# Patient Record
Sex: MALE | Race: WHITE | NOT HISPANIC OR LATINO | Employment: UNEMPLOYED | URBAN - METROPOLITAN AREA
[De-identification: names, ages, dates, MRNs, and addresses within clinical notes are randomized per-mention and may not be internally consistent; named-entity substitution may affect disease eponyms.]

---

## 2022-10-28 ENCOUNTER — PATIENT OUTREACH (OUTPATIENT)
Dept: FAMILY MEDICINE CLINIC | Facility: CLINIC | Age: 52
End: 2022-10-28

## 2022-10-28 ENCOUNTER — OFFICE VISIT (OUTPATIENT)
Dept: FAMILY MEDICINE CLINIC | Facility: CLINIC | Age: 52
End: 2022-10-28

## 2022-10-28 VITALS
DIASTOLIC BLOOD PRESSURE: 76 MMHG | RESPIRATION RATE: 18 BRPM | BODY MASS INDEX: 25.91 KG/M2 | HEIGHT: 68 IN | TEMPERATURE: 96.9 F | HEART RATE: 72 BPM | OXYGEN SATURATION: 99 % | WEIGHT: 171 LBS | SYSTOLIC BLOOD PRESSURE: 116 MMHG

## 2022-10-28 DIAGNOSIS — F32.A DEPRESSION, UNSPECIFIED DEPRESSION TYPE: Primary | ICD-10-CM

## 2022-10-28 DIAGNOSIS — Z78.9 NEED FOR FOLLOW-UP BY SOCIAL WORKER: Primary | ICD-10-CM

## 2022-10-28 RX ORDER — FLUOXETINE HYDROCHLORIDE 20 MG/1
20 CAPSULE ORAL DAILY
Qty: 90 CAPSULE | Refills: 3 | Status: SHIPPED | OUTPATIENT
Start: 2022-10-28

## 2022-10-28 NOTE — PROGRESS NOTES
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  He has found it difficult to find other jobs that can allow him to do both  Bianca Clinton identified finances as a major stressor  He pays $1500/month for rent and cannot afford it on his own if his wife leaves  He is working on his [employer] license to become a   HELEN CANTRELL and Bianca Clinton did discuss [employer] as an option  He has been in touch with them and has a meeting on 11/30  Bianca Clinton spoke often about his divorce with his wife  He did also go from topic to topic  HELEN CANTRELL did provide supportive counseling and spoke with him regarding focusing on things he can control as he had kept saying he was having trouble remembering and decreased concentration  HELEN CANTRELL did provide contact information for [employer] for free consultation  He also would go through cycles of blaming himself for the divorce, blaming himself for being too empathetic, and then blamed it on his wife going through menopause  His wife had wanted him to go to therapy but he never did because of the cost  He is not eligible for Medical Assistance at this time  He does have [email or web address]  HELEN CANTRELL and Bianca Clinton discussed connecting with therapist and psychiatrist with funding through the [facility] department   He is agreeable and HELEN CANTRELL facilitated call and was connected with [employer] who completed the Liability Packet with Forest Montalvo via phone call  She advised that [address] will reach out Monday 10/31 to schedule an intake  He will have a $0 copay which will last 1 year unless obtains insurance that covers mental health care  It will not cover medications  Forest Montalvo expressed understanding and will pay for them out of pocket  Forest Montalvo denied suicidal ideation  HELEN CANTRELL did provide [facility] ([facility] AT [facility] and directed him to go the ED in the event of a mental health crisis  He does have support from sister who is also local     HELEN CANTRELL reviewed the form to apply for the sliding fee scale (SFS) through [employer]  HELEN CANTRELL did explain difference between this for care at [place] [address] and that it does not cover care outside of [place] [address]  HELEN CANTRELL did advised there are different discount programs for specialist offices and HELEN CANTRELL and review them with patient as need arises  Forest Montalvo did appear overwhelmed but did express understanding  Per request from [address] wrote down all the information provided one sheet of paper and a brief explanation  HEELN CANRTELL also put each in priority  HELEN CANTRELL did walk Forest Montalvo to where he needs to check out and the financial counselor office  HELEN CANTRELL reminded him to  his medication prescribed at this office visit  HELEN CANTRELL will remain available for psychosocial support as needed

## 2022-10-28 NOTE — ASSESSMENT & PLAN NOTE
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

## 2022-10-28 NOTE — PROGRESS NOTES
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 [place]  Currently has 2 kids in which he is concerned that he will not be seeing lung after the divorce is finalized  Currently not working and not able to seek out treatment for mental health  Plan  Will start patient on Prozac 20 mg daily  Will have social work engage with this patient during this visit  Will follow-up with patient in a month  Emergency resources were provided  Orders:  -     FLUoxetine (PROzac) 20 mg capsule; Take 1 capsule (20 mg total) by mouth daily       Subjective:      Patient ID: Elizabeth Maldonado is a 47 y o  male  No real significant past medical history coming in for establishment of care  Patient reports that he has been feeling severely depressed since his wife has wanted a divorce  This has been a significant source of distress as patient recently moved here with his family from [place] with hopes to build a life with her here in the [place]  Patient was recently seen at [facility] due to significant distress, however at that time he did not meet criteria for inpatient  Patient would like to have assistance with his mental health however, he is unable to afford mental health services as he is not working at this time  Patient denies any suicidal or homicidal ideation at this time  The following portions of the patient's history were reviewed and updated as appropriate: allergies, current medications, past family history, past medical history, past social history, past surgical history, and problem list     Review of Systems   Constitutional: Negative for chills and fever  HENT: Negative for ear pain and sore throat  Eyes: Negative for pain and visual disturbance  Respiratory: Negative for cough and shortness of breath  Cardiovascular: Negative for chest pain and palpitations  Gastrointestinal: Negative for abdominal pain and vomiting  Genitourinary: Negative for dysuria and hematuria  Musculoskeletal: Negative for arthralgias and back pain  Skin: Negative for color change and rash  Neurological: Negative for seizures and syncope  Psychiatric/Behavioral: Positive for decreased concentration  Significantly distress  All other systems reviewed and are negative  Objective:      /76 (BP Location: Left arm, Patient Position: Sitting, Cuff Size: Adult)   Pulse 72   Temp (!) 96 9 °F (36 1 °C) (Temporal)   Resp 18   Ht 5' 7 72" (1 72 m)   Wt 77 6 kg (171 lb)   SpO2 99%   BMI 26 22 kg/m²          Physical Exam  Constitutional:       General: He is not in acute distress  Appearance: Normal appearance  He is not toxic-appearing or diaphoretic  HENT:      Head: Normocephalic  Mouth/Throat:      Mouth: Mucous membranes are moist    Eyes:      Extraocular Movements: Extraocular movements intact  Pupils: Pupils are equal, round, and reactive to light  Cardiovascular:      Rate and Rhythm: Normal rate and regular rhythm  Pulmonary:      Effort: Pulmonary effort is normal  No tachypnea  Breath sounds: Normal breath sounds  No wheezing or rales  Chest:      Chest wall: No edema  There is no dullness to percussion  Abdominal:      General: Abdomen is flat  There is no distension  Palpations: Abdomen is soft  Tenderness: There is no abdominal tenderness  Musculoskeletal:      Right lower leg: No edema  Left lower leg: No edema  Skin:     Capillary Refill: Capillary refill takes less than 2 seconds  Neurological:      General: No focal deficit present  Mental Status: He is alert and oriented to person, place, and time  Psychiatric:         Mood and Affect: Mood is anxious  Affect is tearful  Speech: Speech normal          Behavior: Behavior normal          Thought Content: Thought content does not include homicidal or suicidal ideation             Carie  Medicine PGY-2  10/28/2022

## 2022-10-31 NOTE — PROGRESS NOTES
10/31/22:    HELEN CANTRELL received call from Mayra Bowers with [facility] who advised the paperwork was sent to [facility] and patient should be receiving a call from them today  HELEN CANTRELL did review [employer] and noticed sfs application had been received 10/19  HELEN CANTRELL noted as of 10/29 the patient's balance is $0     HELEN CANTRELL will continue to remain available for psychosocial support as needed

## 2022-11-04 ENCOUNTER — OFFICE VISIT (OUTPATIENT)
Dept: FAMILY MEDICINE CLINIC | Facility: CLINIC | Age: 52
End: 2022-11-04

## 2022-11-04 VITALS
BODY MASS INDEX: 25.16 KG/M2 | DIASTOLIC BLOOD PRESSURE: 80 MMHG | HEART RATE: 80 BPM | TEMPERATURE: 96.9 F | WEIGHT: 166 LBS | OXYGEN SATURATION: 97 % | SYSTOLIC BLOOD PRESSURE: 108 MMHG | RESPIRATION RATE: 18 BRPM | HEIGHT: 68 IN

## 2022-11-04 DIAGNOSIS — F32.A DEPRESSION, UNSPECIFIED DEPRESSION TYPE: Primary | ICD-10-CM

## 2022-11-04 DIAGNOSIS — Z23 ENCOUNTER FOR ADMINISTRATION OF VACCINE: ICD-10-CM

## 2022-11-04 DIAGNOSIS — G47.9 SLEEP DISTURBANCE: ICD-10-CM

## 2022-11-04 DIAGNOSIS — Z00.00 HEALTH CARE MAINTENANCE: ICD-10-CM

## 2022-11-04 RX ORDER — TRAZODONE HYDROCHLORIDE 50 MG/1
50 TABLET ORAL
Qty: 90 TABLET | Refills: 3 | Status: SHIPPED | OUTPATIENT
Start: 2022-11-04

## 2022-11-04 NOTE — PATIENT INSTRUCTIONS
Melatonin with trazodone 50 mg 30 minutes before bedtime     Still having issues sleeping consider taking additional trazodone tablet 50 mg after  1/2 hour of not sleeping

## 2022-11-05 PROBLEM — G47.9 SLEEP DISTURBANCE: Status: ACTIVE | Noted: 2022-11-05

## 2022-11-05 PROBLEM — Z00.00 HEALTH CARE MAINTENANCE: Status: ACTIVE | Noted: 2022-11-05

## 2022-11-05 NOTE — ASSESSMENT & PLAN NOTE
Mood improved from last visit  Patient has started Prozac medication and reports no side effects  Patient reports counseling appointment on Monday  Patient is working with  to address his social determinants of health  Will have patient follow up on 11/27 to continue to assess his mood  Can consider increasing Prozac after 6 weeks

## 2022-11-05 NOTE — ASSESSMENT & PLAN NOTE
Patient up-to-date on all his shot after the influenza vaccine as well as the Tdap vaccine  Patient is currently uninsured  Will address colonoscopy once patient obtains insurance

## 2022-11-05 NOTE — PROGRESS NOTES
Assessment/Plan:    1  Depression, unspecified depression type  Assessment & Plan:  Mood improved from last visit  Patient has started Prozac medication and reports no side effects  Patient reports counseling appointment on Monday  Patient is working with  to address his social determinants of health  Will have patient follow up on 11/27 to continue to assess his mood  Can consider increasing Prozac after 6 weeks  2  Sleep disturbance  Assessment & Plan:  Currently not practicing good sleep hygiene in the setting of acute stressors  S encourage patient to have same sleeping and waking time  In the interim, will prescribe trazodone 50 mg 30 minutes before bed with melatonin 100 mcg  Can consider additional dose of trazodone if patient is having issues falling asleep initially  Orders:  -     traZODone (DESYREL) 50 mg tablet; Take 1 tablet (50 mg total) by mouth daily at bedtime  -     Melatonin 500 MCG TBDP; Take 2 tablets (1,000 mcg total) by mouth daily at bedtime    3  Health care maintenance  Assessment & Plan:  Patient up-to-date on all his shot after the influenza vaccine as well as the Tdap vaccine  Patient is currently uninsured  Will address colonoscopy once patient obtains insurance  Orders:  -     Hepatitis C antibody; Future  -     HIV 1/2 ANTIGEN/ANTIBODY (4TH GENERATION) W REFLEX [facility]; Future    4  Encounter for administration of vaccine  -     influenza vaccine, quadrivalent, recombinant, PF, 0 5 mL, for patients 18 yr+ (FLUBLOK)  -     TDAP VACCINE GREATER THAN OR EQUAL TO 8YO IM       Subjective:      Patient ID: Horacio Russell is a 47 y o  male  Past medical history notable for acute stress disorder secondary to ongoing divorce, was recently started on Prozac and plugged in with therapist to address this acute stress disorder is coming in for follow-up visit  Patient noted that his therapy appointment 1st time is going to be on Monday    Patient reports that he has been compliant with the Prozac medication  Patient reports that currently he has had trouble sleeping  He has issues falling asleep as his mind is always racing with thoughts  Patient is currently  Sleeping on the couch  And has issues falling asleep  Patient denies any homicidal or suicidal ideation  The following portions of the patient's history were reviewed and updated as appropriate: allergies, current medications, past family history, past medical history, past social history, past surgical history, and problem list     Review of Systems   Constitutional: Negative for chills and fever  HENT: Negative for ear pain and sore throat  Eyes: Negative for pain and visual disturbance  Respiratory: Negative for cough and shortness of breath  Cardiovascular: Negative for chest pain and palpitations  Gastrointestinal: Negative for abdominal pain and vomiting  Genitourinary: Negative for dysuria and hematuria  Musculoskeletal: Negative for arthralgias and back pain  Skin: Negative for color change and rash  Neurological: Negative for seizures and syncope  Psychiatric/Behavioral: Positive for decreased concentration  Significantly distress  All other systems reviewed and are negative  Objective:      /80 (BP Location: Left arm, Patient Position: Sitting, Cuff Size: Adult)   Pulse 80   Temp (!) 96 9 °F (36 1 °C) (Temporal)   Resp 18   Ht 5' 7 72" (1 72 m)   Wt 75 3 kg (166 lb)   SpO2 97%   BMI 25 45 kg/m²          Physical Exam  Constitutional:       General: He is not in acute distress  Appearance: Normal appearance  He is not toxic-appearing or diaphoretic  HENT:      Head: Normocephalic  Mouth/Throat:      Mouth: Mucous membranes are moist    Eyes:      Extraocular Movements: Extraocular movements intact  Pupils: Pupils are equal, round, and reactive to light  Cardiovascular:      Rate and Rhythm: Normal rate and regular rhythm  Pulmonary:      Effort: Pulmonary effort is normal  No tachypnea  Breath sounds: Normal breath sounds  No wheezing or rales  Chest:      Chest wall: No edema  There is no dullness to percussion  Abdominal:      General: Abdomen is flat  There is no distension  Palpations: Abdomen is soft  Tenderness: There is no abdominal tenderness  Musculoskeletal:      Right lower leg: No edema  Left lower leg: No edema  Skin:     Capillary Refill: Capillary refill takes less than 2 seconds  Neurological:      General: No focal deficit present  Mental Status: He is alert and oriented to person, place, and time  Psychiatric:         Mood and Affect: Mood is anxious and depressed  Affect is labile and tearful  Speech: Speech normal          Behavior: Behavior normal          Thought Content: Thought content does not include homicidal or suicidal ideation             [place] [address] Medicine PGY-2  11/5/2022

## 2022-11-05 NOTE — ASSESSMENT & PLAN NOTE
Currently not practicing good sleep hygiene in the setting of acute stressors  S encourage patient to have same sleeping and waking time  In the interim, will prescribe trazodone 50 mg 30 minutes before bed with melatonin 100 mcg  Can consider additional dose of trazodone if patient is having issues falling asleep initially

## 2022-11-29 ENCOUNTER — PATIENT OUTREACH (OUTPATIENT)
Dept: FAMILY MEDICINE CLINIC | Facility: CLINIC | Age: 52
End: 2022-11-29

## 2022-11-29 ENCOUNTER — OFFICE VISIT (OUTPATIENT)
Dept: FAMILY MEDICINE CLINIC | Facility: CLINIC | Age: 52
End: 2022-11-29

## 2022-11-29 VITALS
DIASTOLIC BLOOD PRESSURE: 74 MMHG | BODY MASS INDEX: 25.1 KG/M2 | WEIGHT: 165.6 LBS | SYSTOLIC BLOOD PRESSURE: 112 MMHG | HEART RATE: 79 BPM | TEMPERATURE: 97 F | RESPIRATION RATE: 16 BRPM | OXYGEN SATURATION: 97 % | HEIGHT: 68 IN

## 2022-11-29 DIAGNOSIS — G47.9 SLEEP DISTURBANCE: ICD-10-CM

## 2022-11-29 DIAGNOSIS — F32.A DEPRESSION, UNSPECIFIED DEPRESSION TYPE: Primary | ICD-10-CM

## 2022-11-29 DIAGNOSIS — Z00.00 HEALTH CARE MAINTENANCE: ICD-10-CM

## 2022-11-29 RX ORDER — TRAZODONE HYDROCHLORIDE 50 MG/1
50 TABLET ORAL
Qty: 90 TABLET | Refills: 3 | Status: SHIPPED | OUTPATIENT
Start: 2022-11-29

## 2022-11-29 RX ORDER — LORAZEPAM 1 MG/1
1 TABLET ORAL EVERY 6 HOURS PRN
COMMUNITY
Start: 2022-10-17

## 2022-11-29 RX ORDER — FLUOXETINE HYDROCHLORIDE 40 MG/1
40 CAPSULE ORAL DAILY
Qty: 90 CAPSULE | Refills: 3 | Status: SHIPPED | OUTPATIENT
Start: 2022-11-29

## 2022-11-29 NOTE — PROGRESS NOTES
HELEN CANTRELL placed follow up call to the patient, Brenda Smart and discussed progress with mental health care  He stated wants to go to counseling however cannot afford it  HELEN CANTRELL inquired about nodila that was a $0 co-pay through [facility]  Brenda Smart then confirmed that he hasbeen going to [employer]  There have been 3 meetings  Did go yesterday  Brenda Smart needed redirection often to speak to his counselor  Medications prescribed by PCP is helping  HELEN CANTRELL and Brenda Lundis discussed cost of medications  He is only taking one pill and able to afford it  He is stressed and not sleeping well  HELEN CANTRELL encouraged to keep meeting with counselor to address stress  He spent Thanksgiving with his children and sister while spouse is in [employer] handling affairs  Brenda Smart reported wants an   HELEN CANTRELL reminded him that HELEN CANTRELL provided him with contact information for [employer]  HELEN CANTRELL advised they do not take on divorce cases but can consult with them and see if they have low-cost recommendations  Brenda Smart is still fixated on the divorce and that his spouse is not changing her mind  He wishes they would go back to [employer] with her  HELEN CANTRELL provided supportive counseling and encouraged him to focus on things he can control  Brenda Smart is training to be   He has to take a test and then will be hired  He is using savings at this time  HELEN CANTRELL and Brenda Lundis discussed [employer]  He advised that he needs to be available for training and has sciatica that makes most jobs impossible  Brenda Berry indicated that $1600 bill from [facility] - received one month ago - needs to call back  He continued that he spoke with someone from [place] - left message yesterday returning her call yesterday  HELEN CANTRELL will email their financial assistance information  HELEN CANTRELL emailed information regarding [address] and food resources including food glez/pantries, biweekly food distribution through [employer]  com to [email or web address]  com    HELEN CM will close referral as goal of connecting with mental health provider has been met  Patient is aware can contact office or SW CM directly as needs arise  HELEN CM will remain available for future psychosocial support as needed

## 2022-11-29 NOTE — PATIENT INSTRUCTIONS
Trazodone 50 mg at nighttime for sleep  30 minutes before   Melatonin 500 mcg 30 minutes before sleep     Prozac 40 mg ( 2 pills a day)

## 2022-11-30 NOTE — ASSESSMENT & PLAN NOTE
Trouble staying asleep  All currently prescribed melatonin  Will try to have patient take trazodone in conjunction to help with maintaining sleep throughout the night    Will encourage patient to practice good sleep hygiene

## 2022-11-30 NOTE — PROGRESS NOTES
Assessment/Plan:    1  Depression, unspecified depression type  Assessment & Plan: Will increase Prozac to 40 mg daily  Will have patient continue to follow up with therapist   Will have patient come back in a month  Orders:  -     FLUoxetine (PROzac) 40 MG capsule; Take 1 capsule (40 mg total) by mouth daily    2  Sleep disturbance  Assessment & Plan:  Trouble staying asleep  All currently prescribed melatonin  Will try to have patient take trazodone in conjunction to help with maintaining sleep throughout the night  Will encourage patient to practice good sleep hygiene    Orders:  -     traZODone (DESYREL) 50 mg tablet; Take 1 tablet (50 mg total) by mouth daily at bedtime    3  Health care maintenance  Assessment & Plan: Will have patient come Back in a month to address health-related screenings         Subjective:      Patient ID: Felicia Brandon is a 47 y o  male  Past medical history notable for acute stress disorder secondary to recent divorce is being followed up today  Per his report he started seeing a therapist and has had 5 sessions  He states that these sessions have been going generally well  Patient reports to still have issues sleeping at night  He states that he is only taking melatonin to sleep at night though I prescribed him trazodone to take in conjunction  Patient denies any SI or homicidal ideation      The following portions of the patient's history were reviewed and updated as appropriate: allergies, current medications, past family history, past medical history, past social history, past surgical history, and problem list     Review of Systems   Constitutional: Negative for chills and fever  HENT: Negative for ear pain and sore throat  Eyes: Negative for pain and visual disturbance  Respiratory: Negative for cough and shortness of breath  Cardiovascular: Negative for chest pain and palpitations  Gastrointestinal: Negative for abdominal pain and vomiting  Genitourinary: Negative for dysuria and hematuria  Musculoskeletal: Negative for arthralgias and back pain  Skin: Negative for color change and rash  Neurological: Negative for seizures and syncope  Psychiatric/Behavioral: Positive for decreased concentration  Significantly distress  All other systems reviewed and are negative  Objective:      /74 (BP Location: Left arm, Patient Position: Sitting, Cuff Size: Standard)   Pulse 79   Temp (!) 97 °F (36 1 °C) (Temporal)   Resp 16   Ht 5' 7 72" (1 72 m)   Wt 75 1 kg (165 lb 9 6 oz)   SpO2 97%   BMI 25 39 kg/m²          Physical Exam  Constitutional:       General: He is not in acute distress  Appearance: Normal appearance  He is not toxic-appearing or diaphoretic  HENT:      Head: Normocephalic  Mouth/Throat:      Mouth: Mucous membranes are moist    Eyes:      Extraocular Movements: Extraocular movements intact  Pupils: Pupils are equal, round, and reactive to light  Cardiovascular:      Rate and Rhythm: Normal rate and regular rhythm  Pulmonary:      Effort: Pulmonary effort is normal  No tachypnea  Breath sounds: Normal breath sounds  No wheezing or rales  Chest:      Chest wall: No edema  There is no dullness to percussion  Abdominal:      General: Abdomen is flat  There is no distension  Palpations: Abdomen is soft  Tenderness: There is no abdominal tenderness  Musculoskeletal:      Right lower leg: No edema  Left lower leg: No edema  Skin:     Capillary Refill: Capillary refill takes less than 2 seconds  Neurological:      General: No focal deficit present  Mental Status: He is alert and oriented to person, place, and time  Psychiatric:         Mood and Affect: Mood is anxious and depressed  Affect is labile and tearful  Speech: Speech normal          Behavior: Behavior normal          Thought Content:  Thought content does not include homicidal or suicidal ideation             Justen Kimball, DO   Family Medicine PGY-2  11/29/2022

## 2022-11-30 NOTE — ASSESSMENT & PLAN NOTE
Will increase Prozac to 40 mg daily  Will have patient continue to follow up with therapist   Will have patient come back in a month

## 2022-12-21 ENCOUNTER — OFFICE VISIT (OUTPATIENT)
Dept: FAMILY MEDICINE CLINIC | Facility: CLINIC | Age: 52
End: 2022-12-21

## 2022-12-21 ENCOUNTER — PATIENT OUTREACH (OUTPATIENT)
Dept: FAMILY MEDICINE CLINIC | Facility: CLINIC | Age: 52
End: 2022-12-21

## 2022-12-21 VITALS
RESPIRATION RATE: 16 BRPM | OXYGEN SATURATION: 97 % | TEMPERATURE: 98 F | WEIGHT: 164 LBS | HEART RATE: 78 BPM | HEIGHT: 68 IN | SYSTOLIC BLOOD PRESSURE: 98 MMHG | DIASTOLIC BLOOD PRESSURE: 60 MMHG | BODY MASS INDEX: 24.86 KG/M2

## 2022-12-21 DIAGNOSIS — G47.9 SLEEP DISTURBANCE: ICD-10-CM

## 2022-12-21 DIAGNOSIS — F32.A DEPRESSION, UNSPECIFIED DEPRESSION TYPE: Primary | ICD-10-CM

## 2022-12-21 DIAGNOSIS — Z00.00 HEALTH CARE MAINTENANCE: ICD-10-CM

## 2022-12-22 NOTE — ASSESSMENT & PLAN NOTE
On Prozac 40 mg daily  Mood much improved though still dealing with acute stressors  Follow-up with tony with weekly psychology visit and monthly psychiatrist visit  Overall improved over the last 2 months

## 2022-12-22 NOTE — PROGRESS NOTES
HELEN CANTRELL met with the patient, Sukh Donahue prior to his office visit per request of Dr Doris Austin  Sukh Baeteo reported that that he has his first psychiatrist appointment tomorrow  He had to wait one month prior to meeting with the psychiatrist  He is looking forward to it because his hands are shaking  He has been meeting with his therapist, Panfilo saini  Sukh Donahue missed an appointment because he mixed up the times and he was visibly upset about this due to be stressed about wasting public funding  HELEN CANTRELL provided supportive counseling  Sukh Rowan became hyper-focused on concepts of his medical records  He did not want anyone having access to his records  HELEN CANTRELL did explain concept of Epic and that it is a secured system  HELEN CANTRELL advised can see notes currently from [facility]  Sukh Rowan expressed worry about employers knowing about his mental health diagnoses  HELEN CANTRELL assured him that no medical information would be released without written consent  He stated that employment forms ask for his medications  HELEN CANTRELL advised that how he fills out forms are his choice  Sukh Donahue will be flying to [place] for [place]  He will be visiting with family - in particular his mother-in-law and father-in-law to "discuss things"  He may try to see some doctors while in [place] as the [address] costs are cheaper in [place]  HELEN CANTRELL did encourage him to discuss with his provider about any medical treatment  Sukh Rowan agreed to contact [facility] again regarding his hospital bill  Sukh Donahue advised not having a phone plan as the minutes are too expensive right now  HELEN CANTRELL advised if he has any questions, he can email HELEN CERVANTES CM and Sukh Donahue discussed his employment opportunities  He was offered a  position right away but he advised that he could not focus as he often thinks about loneliness, his divorce and about money stressors   HELEN CANTRELL did provide supportive counseling and did point out that some of his stressors with money could be alleviated with accepting a job for steady income   CM will remain available for future psychosocial support as needed  Patient continues with his mental health treatment and has been provided local resources  Provider verbally updated

## 2022-12-22 NOTE — ASSESSMENT & PLAN NOTE
On trazodone and melatonin for sleep  Reports improved sleep cycles  Improved sleep hygiene  Which relates to his overall mood

## 2022-12-22 NOTE — PROGRESS NOTES
Assessment/Plan:    1  Depression, unspecified depression type  Assessment & Plan: On Prozac 40 mg daily  Mood much improved though still dealing with acute stressors  Follow-up with tony with weekly psychology visit and monthly psychiatrist visit  Overall improved over the last 2 months  2  Sleep disturbance  Assessment & Plan: On trazodone and melatonin for sleep  Reports improved sleep cycles  Improved sleep hygiene  Which relates to his overall mood  3  Health care maintenance  Assessment & Plan: Will have patient come Back in a month to address health-related screenings including colonoscopy, annual physical          Subjective:      Patient ID: Terra Ge is a 47 y o  male  Past medical history notable for depression  Currently going through a divorce  Last visit patient reports that he had trouble sleeping  At that time, we started trazodone and melatonin at night  Patient reports much better sleep and as a result much better mood  Patient also plugged in with tony  From there services he is expected to follow-up with a counselor weekly and psychiatrist once a month  His first visit with psychiatry will be 12/22  In addition, we started patient on Prozac, initially 20 mg and per her last visit increased to 40 mg  Patient reports to be doing overall better, however continues to be dealing with stresses regarding his divorce  Patient denies any suicidal or homicidal ideation  The following portions of the patient's history were reviewed and updated as appropriate: allergies, current medications, past family history, past medical history, past social history, past surgical history, and problem list     Review of Systems   Constitutional: Negative for chills and fever  HENT: Negative for ear pain and sore throat  Eyes: Negative for pain and visual disturbance  Respiratory: Negative for cough and shortness of breath      Cardiovascular: Negative for chest pain and palpitations  Gastrointestinal: Negative for abdominal pain and vomiting  Genitourinary: Negative for dysuria and hematuria  Musculoskeletal: Negative for arthralgias and back pain  Skin: Negative for color change and rash  Neurological: Negative for seizures and syncope  Psychiatric/Behavioral: Positive for decreased concentration and sleep disturbance (Improving)  Negative for agitation, behavioral problems, confusion, hallucinations, self-injury and suicidal ideas  The patient is not nervous/anxious and is not hyperactive  All other systems reviewed and are negative  Objective:      BP 98/60 (BP Location: Left arm, Patient Position: Sitting, Cuff Size: Standard)   Pulse 78   Temp 98 °F (36 7 °C) (Temporal)   Resp 16   Ht 5' 7 72" (1 72 m)   Wt 74 4 kg (164 lb)   SpO2 97%   BMI 25 14 kg/m²          Physical Exam  Constitutional:       General: He is not in acute distress  Appearance: Normal appearance  He is not toxic-appearing or diaphoretic  HENT:      Head: Normocephalic  Mouth/Throat:      Mouth: Mucous membranes are moist    Eyes:      Extraocular Movements: Extraocular movements intact  Pupils: Pupils are equal, round, and reactive to light  Cardiovascular:      Rate and Rhythm: Normal rate and regular rhythm  Pulmonary:      Effort: Pulmonary effort is normal  No tachypnea  Breath sounds: Normal breath sounds  No wheezing or rales  Chest:      Chest wall: No edema  There is no dullness to percussion  Abdominal:      General: Abdomen is flat  There is no distension  Palpations: Abdomen is soft  Tenderness: There is no abdominal tenderness  Musculoskeletal:      Right lower leg: No edema  Left lower leg: No edema  Skin:     Capillary Refill: Capillary refill takes less than 2 seconds  Neurological:      General: No focal deficit present  Mental Status: He is alert and oriented to person, place, and time     Psychiatric: Mood and Affect: Mood normal          Behavior: Behavior normal            Laurita Treviño DO   Family Medicine PGY-2  12/22/2022

## 2022-12-22 NOTE — ASSESSMENT & PLAN NOTE
Will have patient come Back in a month to address health-related screenings including colonoscopy, annual physical

## 2023-01-04 PROBLEM — Z00.00 HEALTH CARE MAINTENANCE: Status: RESOLVED | Noted: 2022-11-05 | Resolved: 2023-01-04

## 2023-02-10 ENCOUNTER — OFFICE VISIT (OUTPATIENT)
Dept: FAMILY MEDICINE CLINIC | Facility: CLINIC | Age: 53
End: 2023-02-10

## 2023-02-10 VITALS
WEIGHT: 171.6 LBS | DIASTOLIC BLOOD PRESSURE: 86 MMHG | BODY MASS INDEX: 26.01 KG/M2 | TEMPERATURE: 97.1 F | SYSTOLIC BLOOD PRESSURE: 134 MMHG | HEART RATE: 83 BPM | RESPIRATION RATE: 18 BRPM | OXYGEN SATURATION: 98 % | HEIGHT: 68 IN

## 2023-02-10 DIAGNOSIS — B36.9 FUNGAL INFECTION OF SKIN: Primary | ICD-10-CM

## 2023-02-10 DIAGNOSIS — F32.A DEPRESSION, UNSPECIFIED DEPRESSION TYPE: ICD-10-CM

## 2023-02-10 DIAGNOSIS — Z13.9 ENCOUNTER FOR HEALTH-RELATED SCREENING: ICD-10-CM

## 2023-02-10 RX ORDER — PRENATAL VIT 91/IRON/FOLIC/DHA 28-975-200
COMBINATION PACKAGE (EA) ORAL 2 TIMES DAILY
Qty: 30 G | Refills: 0 | Status: SHIPPED | OUTPATIENT
Start: 2023-02-10

## 2023-02-10 NOTE — ASSESSMENT & PLAN NOTE
Could likely be secondary to recent travels or new clothing  Drug-related adverse event could also be considered given new medication regimen, however focal area  Plan  Terbinafine twice a day for 2 weeks  Follow-up if symptoms get worse

## 2023-02-10 NOTE — ASSESSMENT & PLAN NOTE
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

## 2023-02-10 NOTE — PROGRESS NOTES
Assessment/Plan:    1  Fungal infection of skin  Assessment & Plan:  Could likely be secondary to recent travels or new clothing  Drug-related adverse event could also be considered given new medication regimen, however focal area  Plan  Terbinafine twice a day for 2 weeks  Follow-up if symptoms get worse  Orders:  -     terbinafine (LamISIL) 1 % cream; Apply topically 2 (two) times a day    2  Depression, unspecified depression type  Assessment & Plan:  HQ 9=- 9 much improved  Patient currently seeing psychiatrist   Patient also following up with weekly therapy  Patient reports activity levels have resumed back to baseline  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  Denies suicidal or homicidal ideation  Plan  -Continue with pregabalin, and duloxetine  -Continue with therapy  -Social work closely following the case      3  Encounter for health-related screening  Comments:  Patient still uninsured  Colonoscopy deferred  Awaiting HIV and hep C lab work  Subjective:      Patient ID: Dima Mcintyre is a 47 y o  male  Patient is here for follow-up visit regarding depression  Patient recently going through a divorce and has had multiple visit regarding this issue  Patient recently saw psychiatrist medication adjustment was made from Prozac to pregabalin and duloxetine  Patient reports to be feeling much better  Does report to have rash on left lower extremity  He states it is itchy  Patient does report that he has new clothes from [email or web address] [place]  He has recently traveled to [place]  He denies any fevers, chills, chest pain or shortness of breath          The following portions of the patient's history were reviewed and updated as appropriate: allergies, current medications, past family history, past medical history, past social history, past surgical history, and problem list     Review of Systems   Constitutional: Negative for chills and fever  HENT: Negative for ear pain and sore throat  Eyes: Negative for pain and visual disturbance  Respiratory: Negative for cough and shortness of breath  Cardiovascular: Negative for chest pain and palpitations  Gastrointestinal: Negative for abdominal pain and vomiting  Genitourinary: Negative for dysuria and hematuria  Musculoskeletal: Negative for arthralgias and back pain  Skin: Positive for rash  Negative for color change  Neurological: Negative for seizures and syncope  All other systems reviewed and are negative  Objective:      /86 (BP Location: Left arm, Patient Position: Sitting, Cuff Size: Adult)   Pulse 83   Temp (!) 97 1 °F (36 2 °C) (Temporal)   Resp 18   Ht 5' 7 72" (1 72 m)   Wt 77 8 kg (171 lb 9 6 oz)   SpO2 98%   BMI 26 31 kg/m²          Physical Exam  Constitutional:       General: He is not in acute distress  Appearance: Normal appearance  He is not toxic-appearing or diaphoretic  HENT:      Head: Normocephalic  Mouth/Throat:      Mouth: Mucous membranes are moist    Eyes:      Extraocular Movements: Extraocular movements intact  Pupils: Pupils are equal, round, and reactive to light  Cardiovascular:      Rate and Rhythm: Normal rate and regular rhythm  Pulmonary:      Effort: Pulmonary effort is normal  No tachypnea  Breath sounds: Normal breath sounds  No wheezing or rales  Chest:      Chest wall: No edema  There is no dullness to percussion  Abdominal:      General: Abdomen is flat  There is no distension  Palpations: Abdomen is soft  Tenderness: There is no abdominal tenderness  Musculoskeletal:      Right lower leg: No edema  Left lower leg: No edema  Skin:     Capillary Refill: Capillary refill takes less than 2 seconds  Findings: Rash (Annular rash, with central clearing ) present  Neurological:      General: No focal deficit present  Mental Status: He is alert and oriented to person, place, and time     Psychiatric:         Mood and Affect: Mood normal          Behavior: Behavior normal                Emanuel Jackman DO   Family Medicine PGY-2  2/10/2023

## 2023-06-08 ENCOUNTER — OFFICE VISIT (OUTPATIENT)
Dept: DENTISTRY | Facility: CLINIC | Age: 53
End: 2023-06-08

## 2023-06-08 VITALS — SYSTOLIC BLOOD PRESSURE: 132 MMHG | TEMPERATURE: 99.3 F | DIASTOLIC BLOOD PRESSURE: 90 MMHG | HEART RATE: 80 BPM

## 2023-06-08 DIAGNOSIS — Z01.20 ENCOUNTER FOR DENTAL EXAMINATION: Primary | ICD-10-CM

## 2023-06-08 PROBLEM — K05.30 PERIODONTITIS: Status: ACTIVE | Noted: 2023-06-08

## 2023-06-08 RX ORDER — DULOXETIN HYDROCHLORIDE 60 MG/1
60 CAPSULE, DELAYED RELEASE ORAL DAILY
COMMUNITY

## 2023-06-08 RX ORDER — PREGABALIN 50 MG/1
50 CAPSULE ORAL 3 TIMES DAILY
COMMUNITY

## 2023-06-08 NOTE — DENTAL PROCEDURE DETAILS
"Salina Grider presents for a Comprehensive exam  Verbal consent for treatment given in addition to the forms  Reviewed health history - Patient is ASA II  Consents signed: Yes     Perio: Normal  Pain Scale: 3  he had # 19 restored a couple of times and still bothers him  Caries Assessment: Medium  Radiographs: Complete mouth series     Oral Hygiene instruction reviewed and given  Recommended Hygiene recall visits with  Carolee Quesada  Exam  DR Ayah Farris    Treatment Plan:  1  Infection control: referred for PROSTHO CONSULT  2  Periodontal therapy: adult prophy  3  Caries control: as charted   # 19 WILL NEED ENDO AND CROWN   REFERRED FOR ENDO THERAPY   EXPLAINED WE CAN RESTORE WITH CROWN   4   Occlusal evaluation: REFERRED FOR [facility] CONSULT  5    Case Difficulty Type -- patient has a lot of occlusion /jaw issues   Dr Luann Mckeon     Referrals needed: gave referral for endo # 19  large restoration with faulty distal margin    He states it was filled at least 2 times and continued to bother him  Next Visit: PROPHY    Patient presented very anxious and frustrated  kept saying \"he hates his life\"  he explained he is unemployed, cannot find a  job without experience, was a master musician in [place], likely getting , etc        Dr Luann Mckeon explained that his bite/ jaw issues are beyond our clinic's ability to treat and suggested he consult with a     Gave list of referral providers for RCT consult # 19  Gave copy of [address]      "

## 2023-06-13 ENCOUNTER — OFFICE VISIT (OUTPATIENT)
Dept: DENTISTRY | Facility: CLINIC | Age: 53
End: 2023-06-13

## 2023-06-13 VITALS — HEART RATE: 89 BPM | DIASTOLIC BLOOD PRESSURE: 100 MMHG | SYSTOLIC BLOOD PRESSURE: 139 MMHG

## 2023-06-13 DIAGNOSIS — K03.6 ACCRETIONS ON TEETH: Primary | ICD-10-CM

## 2023-06-13 PROCEDURE — D1110 PROPHYLAXIS - ADULT: HCPCS

## 2023-06-13 NOTE — DENTAL PROCEDURE DETAILS
ASA II    Prophylaxis completed with hand instrumentation  Light to moderate plaque and supragingival/subgingival calculus removed  Polished with prophy cup and paste  Flossed and provided Oral Health Instructions  Demonstrated proper brushing and flossing technique  Patient left satisfied and ambulatory      NV:  6 MRC - due 12/2023  Pt will consider crown on tooth #19 - no insurance

## 2024-02-13 ENCOUNTER — HOSPITAL ENCOUNTER (EMERGENCY)
Facility: HOSPITAL | Age: 54
Discharge: HOME | End: 2024-02-13
Attending: EMERGENCY MEDICINE
Payer: OTHER MISCELLANEOUS

## 2024-02-13 ENCOUNTER — APPOINTMENT (EMERGENCY)
Dept: RADIOLOGY | Facility: HOSPITAL | Age: 54
End: 2024-02-13
Payer: OTHER MISCELLANEOUS

## 2024-02-13 VITALS
SYSTOLIC BLOOD PRESSURE: 146 MMHG | RESPIRATION RATE: 20 BRPM | HEART RATE: 88 BPM | OXYGEN SATURATION: 99 % | DIASTOLIC BLOOD PRESSURE: 91 MMHG | TEMPERATURE: 97.8 F | WEIGHT: 194 LBS | BODY MASS INDEX: 29.74 KG/M2

## 2024-02-13 DIAGNOSIS — S89.92XA INJURY OF LEFT KNEE, INITIAL ENCOUNTER: Primary | ICD-10-CM

## 2024-02-13 PROCEDURE — 73564 X-RAY EXAM KNEE 4 OR MORE: CPT

## 2024-02-13 PROCEDURE — 99284 EMERGENCY DEPT VISIT MOD MDM: CPT | Performed by: PHYSICIAN ASSISTANT

## 2024-02-13 PROCEDURE — 99283 EMERGENCY DEPT VISIT LOW MDM: CPT

## 2024-02-14 NOTE — ED PROVIDER NOTES
History  Chief Complaint   Patient presents with    Knee Pain     1/29 fell on L knee, attempted pain cream without success, feels as though something is loose.     Patient is a 53-year-old male with no significant past medical history presents for evaluation of left knee pain.  He states that he was at work on 1/29/2024 when he jumped down from his trailer bed and landed wrong causing him knee pain.  He states that he thought the pain would improve and instead it has been continuing.  He states that he informed his job of the injury and came for evaluation.  Patient states pain is located anteriorly and there can be associated clicking/cracking.  He also complains that it can feel like it is going to give out on him.  Pain is worse with range of motion, especially extension.  No redness, swelling, bruising, deformity.  He is able to weight-bear.  He states he had similar pain on the right side knee when he had a meniscal tear many years ago.  He denies other injury.  Denies numbness or weakness, fever or chills or other complaints at this time.        Prior to Admission Medications   Prescriptions Last Dose Informant Patient Reported? Taking?   DULoxetine (CYMBALTA) 60 mg delayed release capsule  Self Yes No   Sig: Take 60 mg by mouth daily   LORazepam (ATIVAN) 1 mg tablet   Yes No   Sig: Take 1 mg by mouth every 6 (six) hours as needed   Patient not taking: Reported on 6/8/2023   pregabalin (LYRICA) 50 mg capsule  Self Yes No   Sig: Take 50 mg by mouth 3 (three) times a day   terbinafine (LamISIL) 1 % cream   No No   Sig: Apply topically 2 (two) times a day   Patient not taking: Reported on 6/8/2023   traZODone (DESYREL) 50 mg tablet   No No   Sig: Take 1 tablet (50 mg total) by mouth daily at bedtime   Patient not taking: Reported on 6/8/2023      Facility-Administered Medications: None       Past Medical History:   Diagnosis Date    Depression        Past Surgical History:   Procedure Laterality Date    KNEE  SURGERY Left        History reviewed. No pertinent family history.  I have reviewed and agree with the history as documented.    E-Cigarette/Vaping     E-Cigarette/Vaping Substances     Social History     Tobacco Use    Smoking status: Never    Smokeless tobacco: Never   Substance Use Topics    Alcohol use: Never    Drug use: Never       Review of Systems   Constitutional:  Negative for chills and fever.   Eyes:  Negative for visual disturbance.   Respiratory:  Negative for shortness of breath.    Cardiovascular:  Negative for chest pain.   Gastrointestinal:  Negative for abdominal pain, diarrhea, nausea and vomiting.   Genitourinary:  Negative for difficulty urinating.   Musculoskeletal:  Positive for arthralgias. Negative for back pain and joint swelling.   Skin:  Negative for color change and rash.   Neurological:  Negative for syncope, weakness and numbness.   All other systems reviewed and are negative.      Physical Exam  Physical Exam  Vitals and nursing note reviewed.   Constitutional:       General: He is not in acute distress.     Appearance: Normal appearance. He is well-developed. He is not ill-appearing, toxic-appearing or diaphoretic.   HENT:      Head: Normocephalic and atraumatic.      Right Ear: External ear normal.      Left Ear: External ear normal.      Nose: Nose normal.   Eyes:      Conjunctiva/sclera: Conjunctivae normal.   Cardiovascular:      Rate and Rhythm: Normal rate.      Heart sounds: No murmur heard.  Pulmonary:      Effort: Pulmonary effort is normal. No respiratory distress.   Abdominal:      General: Abdomen is flat. There is no distension.   Musculoskeletal:         General: Normal range of motion.      Cervical back: Normal range of motion. No rigidity.      Comments: Full range of motion of the left knee.  Patient states pain anteriorly.  No obvious ligamentous instability.  Able to weight-bear and ambulate.  Neurovascular intact distally.   Skin:     General: Skin is warm and  dry.      Capillary Refill: Capillary refill takes less than 2 seconds.   Neurological:      Mental Status: He is alert.   Psychiatric:         Mood and Affect: Mood normal.         Vital Signs  ED Triage Vitals [02/13/24 1635]   Temperature Pulse Respirations Blood Pressure SpO2   97.8 °F (36.6 °C) 88 20 146/91 99 %      Temp src Heart Rate Source Patient Position - Orthostatic VS BP Location FiO2 (%)   -- Monitor Sitting Right arm --      Pain Score       7           Vitals:    02/13/24 1635   BP: 146/91   Pulse: 88   Patient Position - Orthostatic VS: Sitting         Visual Acuity      ED Medications  Medications - No data to display    Diagnostic Studies  Results Reviewed       None                   XR knee 4+ vw left injury   Final Result by Jose Whitley MD (02/14 0815)      No acute osseous abnormality.            Resident: JOSE WALKER I, the attending radiologist, have reviewed the images and agree with the final report above.      Workstation performed: [id number]                    Procedures  Procedures         ED Course                               SBIRT 20yo+      Flowsheet Row Most Recent Value   Initial Alcohol Screen: US AUDIT-C     1. How often do you have a drink containing alcohol? 0 Filed at: 02/13/2024 1702   2. How many drinks containing alcohol do you have on a typical day you are drinking?  0 Filed at: 02/13/2024 1702   3a. Male UNDER 65: How often do you have five or more drinks on one occasion? 0 Filed at: 02/13/2024 1702   Audit-C Score 0 Filed at: 02/13/2024 1702   DAV: How many times in the past year have you...    Used an illegal drug or used a prescription medication for non-medical reasons? Never Filed at: 02/13/2024 1702                      Medical Decision Making  We will x-ray the left knee.  X-ray reviewed by me and interpreted as no acute bony abnormality.  Discussed results with patient.  Explained continued follow-up with his work/Worker's Comp./occ medicine as he  may need to see orthopedics and have further evaluation/imaging such as MRI.  Offered a knee immobilizer which patient declined.  Discussed continued supportive care at home.  Discussed strict return precautions if symptoms worsen or new symptoms arise.  Patient states understanding and agrees with plan.    Amount and/or Complexity of Data Reviewed  Radiology: ordered and independent interpretation performed. Decision-making details documented in ED Course.             Disposition  Final diagnoses:   Injury of left knee, initial encounter     Time reflects when diagnosis was documented in both MDM as applicable and the Disposition within this note       Time User Action Codes Description Comment    2/13/2024  6:44 PM Christine Grimm Add [S89.92XA] Injury of left knee, initial encounter           ED Disposition       ED Disposition   Discharge    Condition   Stable    Date/Time   Tue Feb 13, 2024 1844    Comment   Huan Johns discharge to home/self care.                   Follow-up Information       Follow up With Specialties Details Why Contact Info Additional Information    Follow up with your worker's comp/occupational medicine group in 1 day. You should follow up with orthopedics.         [facility] [place] [facility] Schedule an appointment as soon as possible for a visit   [address] [facility]  [address]  [facility] [address] [phone] [facility] [place], [address] [place] [address], [address], [place], [place], [address] [phone]    [facility] [place]  Schedule an appointment as soon as possible for a visit   [facility] [address] [facility] [address]  [phone]     [facility]  If symptoms worsen [address] [place] [id number] [phone] [facility], [address] [place] [address]  [place], [address]            Discharge Medication List as of 2/13/2024  6:48 PM        CONTINUE these medications which have NOT CHANGED    Details   DULoxetine (CYMBALTA) 60 mg delayed release capsule Take 60 mg by mouth daily, Historical Med      LORazepam (ATIVAN) 1 mg tablet Take 1 mg by mouth every 6 (six) hours as needed, Starting Mon 10/17/2022, Historical Med      pregabalin (LYRICA) 50 mg capsule Take 50 mg by mouth 3 (three) times a day, Historical Med      terbinafine (LamISIL) 1 % cream Apply topically 2 (two) times a day, Starting Fri 2/10/2023, Normal      traZODone (DESYREL) 50 mg tablet Take 1 tablet (50 mg total) by mouth daily at bedtime, Starting Tue 11/29/2022, Normal             No discharge procedures on file.    PDMP Review       None            ED Provider  Electronically Signed by             Christine Grimm PA-C  02/14/24 3741

## 2024-02-14 NOTE — ED NOTES
Patient states he does not want knee immobilizer due to not being able to bend knee. RN educated patient on why this is needed. Patient states he has the same thing at home already.  Provider made aware at this time.      Juve Collado RN  02/13/24 2687

## 2024-02-20 ENCOUNTER — APPOINTMENT (OUTPATIENT)
Dept: URGENT CARE | Age: 54
End: 2024-02-20

## 2024-02-22 ENCOUNTER — TELEPHONE (OUTPATIENT)
Age: 54
End: 2024-02-22

## 2024-02-22 NOTE — TELEPHONE ENCOUNTER
Caller: [employer]    Doctor: Reji    Reason for call: Checking on WC patient Diagnoses     Call back#: [phone]

## 2024-03-11 ENCOUNTER — APPOINTMENT (OUTPATIENT)
Dept: URGENT CARE | Age: 54
End: 2024-03-11
Payer: OTHER MISCELLANEOUS

## 2024-03-11 PROCEDURE — 99213 OFFICE O/P EST LOW 20 MIN: CPT

## 2024-03-18 ENCOUNTER — TELEPHONE (OUTPATIENT)
Age: 54
End: 2024-03-18

## 2024-03-18 ENCOUNTER — APPOINTMENT (OUTPATIENT)
Dept: LAB | Facility: MEDICAL CENTER | Age: 54
End: 2024-03-18
Payer: OTHER MISCELLANEOUS

## 2024-03-18 ENCOUNTER — OFFICE VISIT (OUTPATIENT)
Dept: OBGYN CLINIC | Facility: MEDICAL CENTER | Age: 54
End: 2024-03-18
Payer: OTHER MISCELLANEOUS

## 2024-03-18 VITALS
BODY MASS INDEX: 30.29 KG/M2 | WEIGHT: 193 LBS | SYSTOLIC BLOOD PRESSURE: 133 MMHG | DIASTOLIC BLOOD PRESSURE: 87 MMHG | HEART RATE: 65 BPM | HEIGHT: 67 IN

## 2024-03-18 DIAGNOSIS — S83.242A OTHER TEAR OF MEDIAL MENISCUS, CURRENT INJURY, LEFT KNEE, INITIAL ENCOUNTER: Primary | ICD-10-CM

## 2024-03-18 DIAGNOSIS — Z01.818 PRE-OP TESTING: ICD-10-CM

## 2024-03-18 DIAGNOSIS — S83.242A OTHER TEAR OF MEDIAL MENISCUS, CURRENT INJURY, LEFT KNEE, INITIAL ENCOUNTER: ICD-10-CM

## 2024-03-18 LAB
ANION GAP SERPL CALCULATED.3IONS-SCNC: 8 MMOL/L (ref 4–13)
BUN SERPL-MCNC: 17 MG/DL (ref 5–25)
CALCIUM SERPL-MCNC: 9.5 MG/DL (ref 8.4–10.2)
CHLORIDE SERPL-SCNC: 104 MMOL/L (ref 96–108)
CO2 SERPL-SCNC: 29 MMOL/L (ref 21–32)
CREAT SERPL-MCNC: 0.83 MG/DL (ref 0.6–1.3)
ERYTHROCYTE [DISTWIDTH] IN BLOOD BY AUTOMATED COUNT: 12 % (ref 11.6–15.1)
GFR SERPL CREATININE-BSD FRML MDRD: 101 ML/MIN/1.73SQ M
GLUCOSE P FAST SERPL-MCNC: 95 MG/DL (ref 65–99)
HCT VFR BLD AUTO: 45.7 % (ref 36.5–49.3)
HGB BLD-MCNC: 15.3 G/DL (ref 12–17)
MCH RBC QN AUTO: 28.3 PG (ref 26.8–34.3)
MCHC RBC AUTO-ENTMCNC: 33.5 G/DL (ref 31.4–37.4)
MCV RBC AUTO: 85 FL (ref 82–98)
PLATELET # BLD AUTO: 243 THOUSANDS/UL (ref 149–390)
PMV BLD AUTO: 10.7 FL (ref 8.9–12.7)
POTASSIUM SERPL-SCNC: 3.9 MMOL/L (ref 3.5–5.3)
RBC # BLD AUTO: 5.41 MILLION/UL (ref 3.88–5.62)
SODIUM SERPL-SCNC: 141 MMOL/L (ref 135–147)
WBC # BLD AUTO: 5.98 THOUSAND/UL (ref 4.31–10.16)

## 2024-03-18 PROCEDURE — 36415 COLL VENOUS BLD VENIPUNCTURE: CPT

## 2024-03-18 PROCEDURE — 80048 BASIC METABOLIC PNL TOTAL CA: CPT

## 2024-03-18 PROCEDURE — 85027 COMPLETE CBC AUTOMATED: CPT

## 2024-03-18 PROCEDURE — 99204 OFFICE O/P NEW MOD 45 MIN: CPT | Performed by: ORTHOPAEDIC SURGERY

## 2024-03-18 RX ORDER — TRAMADOL HYDROCHLORIDE 50 MG/1
50 TABLET ORAL EVERY 6 HOURS PRN
Status: CANCELLED | OUTPATIENT
Start: 2024-03-18

## 2024-03-18 RX ORDER — CEFAZOLIN SODIUM 2 G/50ML
2000 SOLUTION INTRAVENOUS ONCE
Status: CANCELLED | OUTPATIENT
Start: 2024-03-28 | End: 2024-03-18

## 2024-03-18 RX ORDER — ACETAMINOPHEN 325 MG/1
650 TABLET ORAL EVERY 6 HOURS PRN
Status: CANCELLED | OUTPATIENT
Start: 2024-03-18

## 2024-03-18 NOTE — LETTER
March 18, 2024     Patient: Huan Johns  YOB: 1970  Date of Visit: 3/18/2024      To Whom it May Concern:    Huan Johns is under my professional care. Huan was seen in my office on 3/18/2024. Huan cannot return to work as this time as he has an injury to his Left knee that will need surgery. The patient may return to work after he is seen by Dr. Mendoza in clinic after his surgery.     If you have any questions or concerns, please don't hesitate to call.         Sincerely,          Ron Mendoza, DO        CC:   No Recipients

## 2024-03-18 NOTE — PROGRESS NOTES
Ortho Sports Medicine Knee New Patient Visit     Assesment:   53 y.o. male left knee medial meniscus tear    Plan:    Conservative treatment:  Ice to knee for 20 minutes at least 1-2 times daily.  PT for ROM/strengthening to knee, hip and core.  OTC NSAIDS prn for pain.    Offered nonsurgical treatment including physical therapy and possible cortisone injection but patient declined    Imaging:  All imaging from today was reviewed by myself and explained to the patient.       Injection:  No Injection planned at this time.      Surgery:  All of the risks and benefits of operative treatment were explained to the patient, as well as the risks and benefits of any alternative treatment options, including nonoperative care. The risks of surgical treatement include, but are not limited to, infection, bleeding, blood clot, neurovascular damage, need for further surgery, continued pain, cardiovascular risk, and anesthesia risk.  The patient understood this and elects to proceed forward with surgical intervention.    We will proceed forward with surgical arthroscopy of the knee with meniscectomy.       Follow up:    Return for 1 week post-op with AMISHA Meier.        Chief Complaint   Patient presents with    Left Knee - Pain       History of Present Illness:    The patient is a 53 y.o. male whose occupation is , referred to me by themself, seen in clinic for evaluation of left knee pain.      Pain is located anterior, medial.  The patient rates the pain as a 6/10.  The pain has been present for 1 months.      The patient sustained an injury on Jan 13, 2024.  The mechanism of injury was a work injury. The pain is characterized as sharp.  The pain is present at all times.      Pain is improved by rest, ice, and NSAIDS.  Pain is aggravated by weight bearing.    Symptoms include knee pain and popping.     The patient has tried rest, ice, and NSAIDS.          Knee Surgical History:  None    Past Medical, Social and Family  History:  Past Medical History:   Diagnosis Date    Depression      Past Surgical History:   Procedure Laterality Date    KNEE SURGERY Left      No Known Allergies  Current Outpatient Medications on File Prior to Visit   Medication Sig Dispense Refill    DULoxetine (CYMBALTA) 60 mg delayed release capsule Take 60 mg by mouth daily      pregabalin (LYRICA) 50 mg capsule Take 50 mg by mouth 3 (three) times a day      LORazepam (ATIVAN) 1 mg tablet Take 1 mg by mouth every 6 (six) hours as needed (Patient not taking: Reported on 6/8/2023)      terbinafine (LamISIL) 1 % cream Apply topically 2 (two) times a day (Patient not taking: Reported on 6/8/2023) 30 g 0    traZODone (DESYREL) 50 mg tablet Take 1 tablet (50 mg total) by mouth daily at bedtime (Patient not taking: Reported on 6/8/2023) 90 tablet 3     No current facility-administered medications on file prior to visit.     Social History     Socioeconomic History    Marital status:      Spouse name: Not on file    Number of children: Not on file    Years of education: Not on file    Highest education level: Not on file   Occupational History    Not on file   Tobacco Use    Smoking status: Never    Smokeless tobacco: Never   Substance and Sexual Activity    Alcohol use: Never    Drug use: Never    Sexual activity: Not on file   Other Topics Concern    Not on file   Social History Narrative    Not on file     Social Determinants of Health     Financial Resource Strain: High Risk (10/31/2022)    Overall Financial Resource Strain (CARDIA)     Difficulty of Paying Living Expenses: Hard   Food Insecurity: No Food Insecurity (10/22/2022)    Hunger Vital Sign     Worried About Running Out of Food in the Last Year: Never true     Ran Out of Food in the Last Year: Never true   Transportation Needs: No Transportation Needs (10/22/2022)    PRAPARE - Transportation     Lack of Transportation (Medical): No     Lack of Transportation (Non-Medical): No   Physical Activity:  "Not on file   Stress: Stress Concern Present (10/31/2022)    [employer] [place] [employer] - Occupational Stress Questionnaire     Feeling of Stress : Very much   Social Connections: Not on file   Intimate Partner Violence: Low Risk  (12/15/2021)    Received from [facility]    Intimate Partner Violence     Insults You: Not on file     Threatens You: Not on file     Screams at You: Not on file     Physically Hurt: Not on file     Intimate Partner Violence Score: Not on file   Housing Stability: Not on file         I have reviewed the past medical, surgical, social and family history, medications and allergies as documented in the EMR.    Review of systems: ROS is negative other than that noted in the HPI.  Constitutional: Negative for fatigue and fever.   HENT: Negative for sore throat.    Respiratory: Negative for shortness of breath.    Cardiovascular: Negative for chest pain.   Gastrointestinal: Negative for abdominal pain.   Endocrine: Negative for cold intolerance and heat intolerance.   Genitourinary: Negative for flank pain.   Musculoskeletal: Negative for back pain.   Skin: Negative for rash.   Allergic/Immunologic: Negative for immunocompromised state.   Neurological: Negative for dizziness.   Psychiatric/Behavioral: Negative for agitation.      Physical Exam:    Blood pressure 133/87, pulse 65, height 5' 7\" (1.702 m), weight 87.5 kg (193 lb).    General/Constitutional: NAD, well developed, well nourished  HENT: Normocephalic, atraumatic  CV: Intact distal pulses, regular rate  Resp: No respiratory distress or labored breathing  GI: Soft and non-tender   Lymphatic: No lymphadenopathy palpated  Neuro: Alert and Oriented x 3, no focal deficits  Psych: Normal mood, normal affect, normal judgement, normal behavior  Skin: Warm, dry, no rashes, no erythema      Knee Exam (focused):                 LEFT   ROM:    0-130   Palpation: Effusion  negative     MJL tenderness  Positive     LJL tenderness  " Negative   Meniscus: Regina  Negative    Apley's Compression  Negative   Instability: Varus  stable     Valgus  stable   Special Tests: Lachman  Negative     Posterior drawer  Negative     Anterior drawer  Negative     Pivot shift  not tested     Dial  not tested   Patella: Palpation  no tenderness     Mobility  1/4     Apprehension  Negative   Other: Single leg 1/4 squat  not tested      LE NV Exam: +2 DP/PT pulses bilaterally  Sensation intact to light touch L2-S1 bilaterally     Bilateral hip ROM demonstrates no pain actively or passively    No calf tenderness to palpation bilaterally    Knee Imaging    MRI of the left knee were reviewed, which demonstrate a partial tear of the medial meniscus of the Left knee.  I have reviewed the radiology report and do not currently have a radiology reading from [facility], but will check the result once the reading is performed.      Scribe Attestation      I,:   am acting as a scribe while in the presence of the attending physician.:       I,:   personally performed the services described in this documentation    as scribed in my presence.:           .[email or web address]

## 2024-03-18 NOTE — H&P (VIEW-ONLY)
Ortho Sports Medicine Knee New Patient Visit     Assesment:   53 y.o. male left knee medial meniscus tear    Plan:    Conservative treatment:  Ice to knee for 20 minutes at least 1-2 times daily.  PT for ROM/strengthening to knee, hip and core.  OTC NSAIDS prn for pain.    Offered nonsurgical treatment including physical therapy and possible cortisone injection but patient declined    Imaging:  All imaging from today was reviewed by myself and explained to the patient.       Injection:  No Injection planned at this time.      Surgery:  All of the risks and benefits of operative treatment were explained to the patient, as well as the risks and benefits of any alternative treatment options, including nonoperative care. The risks of surgical treatement include, but are not limited to, infection, bleeding, blood clot, neurovascular damage, need for further surgery, continued pain, cardiovascular risk, and anesthesia risk.  The patient understood this and elects to proceed forward with surgical intervention.    We will proceed forward with surgical arthroscopy of the knee with meniscectomy.       Follow up:    Return for 1 week post-op with AMISHA Meier.        Chief Complaint   Patient presents with    Left Knee - Pain       History of Present Illness:    The patient is a 53 y.o. male whose occupation is , referred to me by themself, seen in clinic for evaluation of left knee pain.      Pain is located anterior, medial.  The patient rates the pain as a 6/10.  The pain has been present for 1 months.      The patient sustained an injury on Jan 13, 2024.  The mechanism of injury was a work injury. The pain is characterized as sharp.  The pain is present at all times.      Pain is improved by rest, ice, and NSAIDS.  Pain is aggravated by weight bearing.    Symptoms include knee pain and popping.     The patient has tried rest, ice, and NSAIDS.          Knee Surgical History:  None    Past Medical, Social and Family  History:  Past Medical History:   Diagnosis Date    Depression      Past Surgical History:   Procedure Laterality Date    KNEE SURGERY Left      No Known Allergies  Current Outpatient Medications on File Prior to Visit   Medication Sig Dispense Refill    DULoxetine (CYMBALTA) 60 mg delayed release capsule Take 60 mg by mouth daily      pregabalin (LYRICA) 50 mg capsule Take 50 mg by mouth 3 (three) times a day      LORazepam (ATIVAN) 1 mg tablet Take 1 mg by mouth every 6 (six) hours as needed (Patient not taking: Reported on 6/8/2023)      terbinafine (LamISIL) 1 % cream Apply topically 2 (two) times a day (Patient not taking: Reported on 6/8/2023) 30 g 0    traZODone (DESYREL) 50 mg tablet Take 1 tablet (50 mg total) by mouth daily at bedtime (Patient not taking: Reported on 6/8/2023) 90 tablet 3     No current facility-administered medications on file prior to visit.     Social History     Socioeconomic History    Marital status:      Spouse name: Not on file    Number of children: Not on file    Years of education: Not on file    Highest education level: Not on file   Occupational History    Not on file   Tobacco Use    Smoking status: Never    Smokeless tobacco: Never   Substance and Sexual Activity    Alcohol use: Never    Drug use: Never    Sexual activity: Not on file   Other Topics Concern    Not on file   Social History Narrative    Not on file     Social Determinants of Health     Financial Resource Strain: High Risk (10/31/2022)    Overall Financial Resource Strain (CARDIA)     Difficulty of Paying Living Expenses: Hard   Food Insecurity: No Food Insecurity (10/22/2022)    Hunger Vital Sign     Worried About Running Out of Food in the Last Year: Never true     Ran Out of Food in the Last Year: Never true   Transportation Needs: No Transportation Needs (10/22/2022)    PRAPARE - Transportation     Lack of Transportation (Medical): No     Lack of Transportation (Non-Medical): No   Physical Activity:  "Not on file   Stress: Stress Concern Present (10/31/2022)    [employer] [place] [employer] - Occupational Stress Questionnaire     Feeling of Stress : Very much   Social Connections: Not on file   Intimate Partner Violence: Low Risk  (12/15/2021)    Received [facility]    Intimate Partner Violence     Insults You: Not on file     Threatens You: Not on file     Screams at You: Not on file     Physically Hurt: Not on file     Intimate Partner Violence Score: Not on file   Housing Stability: Not on file         I have reviewed the past medical, surgical, social and family history, medications and allergies as documented in the EMR.    Review of systems: ROS is negative other than that noted in the HPI.  Constitutional: Negative for fatigue and fever.   HENT: Negative for sore throat.    Respiratory: Negative for shortness of breath.    Cardiovascular: Negative for chest pain.   Gastrointestinal: Negative for abdominal pain.   Endocrine: Negative for cold intolerance and heat intolerance.   Genitourinary: Negative for flank pain.   Musculoskeletal: Negative for back pain.   Skin: Negative for rash.   Allergic/Immunologic: Negative for immunocompromised state.   Neurological: Negative for dizziness.   Psychiatric/Behavioral: Negative for agitation.      Physical Exam:    Blood pressure 133/87, pulse 65, height 5' 7\" (1.702 m), weight 87.5 kg (193 lb).    General/Constitutional: NAD, well developed, well nourished  HENT: Normocephalic, atraumatic  CV: Intact distal pulses, regular rate  Resp: No respiratory distress or labored breathing  GI: Soft and non-tender   Lymphatic: No lymphadenopathy palpated  Neuro: Alert and Oriented x 3, no focal deficits  Psych: Normal mood, normal affect, normal judgement, normal behavior  Skin: Warm, dry, no rashes, no erythema      Knee Exam (focused):                 LEFT   ROM:    0-130   Palpation: Effusion  negative     MJL tenderness  Positive     LJL tenderness  " Negative   Meniscus: Regina  Negative    Apley's Compression  Negative   Instability: Varus  stable     Valgus  stable   Special Tests: Lachman  Negative     Posterior drawer  Negative     Anterior drawer  Negative     Pivot shift  not tested     Dial  not tested   Patella: Palpation  no tenderness     Mobility  1/4     Apprehension  Negative   Other: Single leg 1/4 squat  not tested      LE NV Exam: +2 DP/PT pulses bilaterally  Sensation intact to light touch L2-S1 bilaterally     Bilateral hip ROM demonstrates no pain actively or passively    No calf tenderness to palpation bilaterally    Knee Imaging    MRI of the left knee were reviewed, which demonstrate a partial tear of the medial meniscus of the Left knee.  I have reviewed the radiology report and do not currently have a radiology reading from [facility], but will check the result once the reading is performed.      Scribe Attestation      I,:   am acting as a scribe while in the presence of the attending physician.:       I,:   personally performed the services described in this documentation    as scribed in my presence.:           .[email or web address]

## 2024-03-18 NOTE — TELEPHONE ENCOUNTER
Caller: tiago from [employer]      Doctor/Office: mary    CB#: [phone]      What needs to be faxed: AVS & work status letter    ATTN to: [id number]    Fax#: [phone]      Documents were successfully e-faxed

## 2024-03-20 ENCOUNTER — TELEPHONE (OUTPATIENT)
Age: 54
End: 2024-03-20

## 2024-03-20 NOTE — TELEPHONE ENCOUNTER
Caller: Isaac[employer]    Doctor: Reji    Reason for call: Questioned work status? Please fax note w/status to #[phone] place claim#[id number] on cover page    Call back#: [phone]

## 2024-03-20 NOTE — PRE-PROCEDURE INSTRUCTIONS
Pre-Surgery Instructions:   Medication Instructions    DULoxetine (CYMBALTA) 60 mg delayed release capsule Take this medication day of surgery if normally taken in the morning.      pregabalin (LYRICA) 50 mg capsule Take this medication day of surgery if normally taken in the morning.      Medication instructions for day surgery reviewed. Please use only a sip of water to take your instructed medications. Avoid all over the counter vitamins, supplements and NSAIDS for one week prior to surgery per anesthesia guidelines. Tylenol is ok to take as needed.     You will receive a call one business day prior to surgery with an arrival time and hospital directions. If your surgery is scheduled on a Monday, the hospital will be calling you on the Friday prior to your surgery. If you have not heard from anyone by 8pm, please call the hospital supervisor through the hospital  at [phone]. ([place] [phone] or [place] [phone]).    Do not eat or drink anything after midnight the night before your surgery, including candy, mints, lifesavers, or chewing gum. Do not drink alcohol 24hrs before your surgery. Try not to smoke at least 24hrs before your surgery.       Follow the pre surgery showering instructions as listed in the “My Surgical Experience Booklet” or otherwise provided by your surgeon's office. Do not use a blade to shave the surgical area 1 week before surgery. It is okay to use a clean electric clippers up to 24 hours before surgery. Do not apply any lotions, creams, including makeup, cologne, deodorant, or perfumes after showering on the day of your surgery. Do not use dry shampoo, hair spray, hair gel, or any type of hair products.     No contact lenses, eye make-up, or artificial eyelashes. Remove nail polish, including gel polish, and any artificial, gel, or acrylic nails if possible. Remove all jewelry including rings and body piercing jewelry.     Wear causal clothing that is easy to take  on and off. Consider your type of surgery.    Keep any valuables, jewelry, piercings at home. Please bring any specially ordered equipment (sling, braces) if indicated.    Arrange for a responsible person to drive you to and from the hospital on the day of your surgery. Please confirm the visitor policy for the day of your procedure when you receive your phone call with an arrival time.     Call the surgeon's office with any new illnesses, exposures, or additional questions prior to surgery.    Please reference your “My Surgical Experience Booklet” for additional information to prepare for your upcoming surgery.

## 2024-03-22 ENCOUNTER — ANESTHESIA EVENT (OUTPATIENT)
Age: 54
End: 2024-03-22
Payer: OTHER MISCELLANEOUS

## 2024-03-27 PROBLEM — S83.242A OTHER TEAR OF MEDIAL MENISCUS, CURRENT INJURY, LEFT KNEE, INITIAL ENCOUNTER: Status: ACTIVE | Noted: 2024-03-27

## 2024-03-27 NOTE — ANESTHESIA PREPROCEDURE EVALUATION
Procedure:  MENISCECTOMY LATERAL /MEDIAL (Left: Knee)    Relevant Problems   ANESTHESIA (within normal limits)      CARDIO (within normal limits)      ENDO (within normal limits)      GI/HEPATIC (within normal limits)      /RENAL (within normal limits)      HEMATOLOGY (within normal limits)      MUSCULOSKELETAL (within normal limits)      NEURO/PSYCH   (+) Depression      PULMONARY (within normal limits)      Rheumatology   (+) Other tear of medial meniscus, current injury, left knee, initial encounter        Physical Exam    Airway    Mallampati score: II  TM Distance: >3 FB  Neck ROM: full     Dental   No notable dental hx     Cardiovascular  Rhythm: regular, Rate: normal, Cardiovascular exam normal    Pulmonary  Pulmonary exam normal Breath sounds clear to auscultation    Other Findings        Anesthesia Plan  ASA Score- 1     Anesthesia Type- general with ASA Monitors.         Additional Monitors:     Airway Plan: LMA.           Plan Factors-Exercise tolerance (METS): >4 METS.    Chart reviewed. EKG reviewed. Imaging results reviewed. Existing labs reviewed. Patient summary reviewed.          Obstructive sleep apnea risk education given perioperatively.        Induction- intravenous.    Postoperative Plan- Plan for postoperative opioid use.     Informed Consent- Anesthetic plan and risks discussed with patient.  I personally reviewed this patient with the CRNA. Discussed and agreed on the Anesthesia Plan with the CRNA..          Recent labs personally reviewed:  Lab Results   Component Value Date    WBC 5.98 03/18/2024    HGB 15.3 03/18/2024     03/18/2024     Lab Results   Component Value Date    K 3.9 03/18/2024    BUN 17 03/18/2024    CREATININE 0.83 03/18/2024     I, Parag Dobson MD, have personally seen and evaluated the patient prior to anesthetic care.  I have reviewed the pre-anesthetic record, and other medical records if appropriate to the anesthetic care.  If a CRNA is involved in the  case, I have reviewed the CRNA assessment, if present, and agree. Risks/benefits and alternatives discussed with patient including possible PONV, sore throat, and possibility of rare anesthetic and surgical emergencies.

## 2024-03-28 ENCOUNTER — TELEPHONE (OUTPATIENT)
Age: 54
End: 2024-03-28

## 2024-03-28 ENCOUNTER — ANESTHESIA (OUTPATIENT)
Age: 54
End: 2024-03-28
Payer: OTHER MISCELLANEOUS

## 2024-03-28 ENCOUNTER — HOSPITAL ENCOUNTER (OUTPATIENT)
Age: 54
Setting detail: OUTPATIENT SURGERY
Discharge: HOME | End: 2024-03-28
Attending: ORTHOPAEDIC SURGERY | Admitting: ORTHOPAEDIC SURGERY
Payer: OTHER MISCELLANEOUS

## 2024-03-28 VITALS
OXYGEN SATURATION: 97 % | SYSTOLIC BLOOD PRESSURE: 150 MMHG | RESPIRATION RATE: 16 BRPM | BODY MASS INDEX: 30.61 KG/M2 | HEIGHT: 67 IN | HEART RATE: 73 BPM | TEMPERATURE: 97.5 F | DIASTOLIC BLOOD PRESSURE: 93 MMHG | WEIGHT: 195 LBS

## 2024-03-28 DIAGNOSIS — Z87.828 S/P ARTHROSCOPIC PARTIAL MEDIAL MENISCECTOMY OF LEFT KNEE: Primary | ICD-10-CM

## 2024-03-28 DIAGNOSIS — Z98.890 S/P ARTHROSCOPIC PARTIAL MEDIAL MENISCECTOMY OF LEFT KNEE: Primary | ICD-10-CM

## 2024-03-28 PROCEDURE — 29881 ARTHRS KNE SRG MNISECTMY M/L: CPT | Performed by: ORTHOPAEDIC SURGERY

## 2024-03-28 RX ORDER — FENTANYL CITRATE/PF 50 MCG/ML
50 SYRINGE (ML) INJECTION
Status: DISCONTINUED | OUTPATIENT
Start: 2024-03-28 | End: 2024-03-28 | Stop reason: HOSPADM

## 2024-03-28 RX ORDER — SODIUM CHLORIDE, SODIUM LACTATE, POTASSIUM CHLORIDE, CALCIUM CHLORIDE 600; 310; 30; 20 MG/100ML; MG/100ML; MG/100ML; MG/100ML
125 INJECTION, SOLUTION INTRAVENOUS CONTINUOUS
Status: DISCONTINUED | OUTPATIENT
Start: 2024-03-28 | End: 2024-03-28 | Stop reason: HOSPADM

## 2024-03-28 RX ORDER — ONDANSETRON 2 MG/ML
4 INJECTION INTRAMUSCULAR; INTRAVENOUS ONCE AS NEEDED
Status: DISCONTINUED | OUTPATIENT
Start: 2024-03-28 | End: 2024-03-28 | Stop reason: HOSPADM

## 2024-03-28 RX ORDER — HYDROMORPHONE HCL/PF 1 MG/ML
0.5 SYRINGE (ML) INJECTION
Status: DISCONTINUED | OUTPATIENT
Start: 2024-03-28 | End: 2024-03-28 | Stop reason: HOSPADM

## 2024-03-28 RX ORDER — CEFAZOLIN SODIUM 2 G/50ML
2000 SOLUTION INTRAVENOUS ONCE
Status: COMPLETED | OUTPATIENT
Start: 2024-03-28 | End: 2024-03-28

## 2024-03-28 RX ORDER — ONDANSETRON 2 MG/ML
INJECTION INTRAMUSCULAR; INTRAVENOUS AS NEEDED
Status: DISCONTINUED | OUTPATIENT
Start: 2024-03-28 | End: 2024-03-28

## 2024-03-28 RX ORDER — HYDROMORPHONE HCL/PF 1 MG/ML
SYRINGE (ML) INJECTION AS NEEDED
Status: DISCONTINUED | OUTPATIENT
Start: 2024-03-28 | End: 2024-03-28

## 2024-03-28 RX ORDER — HYDROMORPHONE HCL IN WATER/PF 6 MG/30 ML
0.2 PATIENT CONTROLLED ANALGESIA SYRINGE INTRAVENOUS
Status: DISCONTINUED | OUTPATIENT
Start: 2024-03-28 | End: 2024-03-28 | Stop reason: HOSPADM

## 2024-03-28 RX ORDER — KETOROLAC TROMETHAMINE 30 MG/ML
INJECTION, SOLUTION INTRAMUSCULAR; INTRAVENOUS AS NEEDED
Status: DISCONTINUED | OUTPATIENT
Start: 2024-03-28 | End: 2024-03-28

## 2024-03-28 RX ORDER — DIPHENHYDRAMINE HYDROCHLORIDE 50 MG/ML
12.5 INJECTION INTRAMUSCULAR; INTRAVENOUS ONCE AS NEEDED
Status: DISCONTINUED | OUTPATIENT
Start: 2024-03-28 | End: 2024-03-28 | Stop reason: HOSPADM

## 2024-03-28 RX ORDER — METOCLOPRAMIDE HYDROCHLORIDE 5 MG/ML
10 INJECTION INTRAMUSCULAR; INTRAVENOUS ONCE AS NEEDED
Status: DISCONTINUED | OUTPATIENT
Start: 2024-03-28 | End: 2024-03-28 | Stop reason: HOSPADM

## 2024-03-28 RX ORDER — MIDAZOLAM HYDROCHLORIDE 2 MG/2ML
INJECTION, SOLUTION INTRAMUSCULAR; INTRAVENOUS AS NEEDED
Status: DISCONTINUED | OUTPATIENT
Start: 2024-03-28 | End: 2024-03-28

## 2024-03-28 RX ORDER — PROPOFOL 10 MG/ML
INJECTION, EMULSION INTRAVENOUS AS NEEDED
Status: DISCONTINUED | OUTPATIENT
Start: 2024-03-28 | End: 2024-03-28

## 2024-03-28 RX ORDER — LIDOCAINE HYDROCHLORIDE 10 MG/ML
0.5 INJECTION, SOLUTION EPIDURAL; INFILTRATION; INTRACAUDAL; PERINEURAL ONCE AS NEEDED
Status: DISCONTINUED | OUTPATIENT
Start: 2024-03-28 | End: 2024-03-28 | Stop reason: HOSPADM

## 2024-03-28 RX ORDER — FENTANYL CITRATE 50 UG/ML
INJECTION, SOLUTION INTRAMUSCULAR; INTRAVENOUS AS NEEDED
Status: DISCONTINUED | OUTPATIENT
Start: 2024-03-28 | End: 2024-03-28

## 2024-03-28 RX ORDER — ACETAMINOPHEN 325 MG/1
650 TABLET ORAL EVERY 6 HOURS PRN
Status: DISCONTINUED | OUTPATIENT
Start: 2024-03-28 | End: 2024-03-28 | Stop reason: HOSPADM

## 2024-03-28 RX ORDER — OXYCODONE HYDROCHLORIDE 5 MG/1
5 TABLET ORAL EVERY 4 HOURS PRN
Qty: 15 TABLET | Refills: 0 | Status: SHIPPED | OUTPATIENT
Start: 2024-03-28

## 2024-03-28 RX ORDER — DEXAMETHASONE SODIUM PHOSPHATE 10 MG/ML
INJECTION, SOLUTION INTRAMUSCULAR; INTRAVENOUS AS NEEDED
Status: DISCONTINUED | OUTPATIENT
Start: 2024-03-28 | End: 2024-03-28

## 2024-03-28 RX ORDER — TRAMADOL HYDROCHLORIDE 50 MG/1
50 TABLET ORAL EVERY 6 HOURS PRN
Status: DISCONTINUED | OUTPATIENT
Start: 2024-03-28 | End: 2024-03-28 | Stop reason: HOSPADM

## 2024-03-28 RX ADMIN — MIDAZOLAM 2 MG: 1 INJECTION INTRAMUSCULAR; INTRAVENOUS at 08:22

## 2024-03-28 RX ADMIN — DEXAMETHASONE SODIUM PHOSPHATE 10 MG: 10 INJECTION INTRAMUSCULAR; INTRAVENOUS at 08:27

## 2024-03-28 RX ADMIN — KETOROLAC TROMETHAMINE 30 MG: 30 INJECTION, SOLUTION INTRAMUSCULAR; INTRAVENOUS at 09:14

## 2024-03-28 RX ADMIN — CEFAZOLIN SODIUM 2000 MG: 2 SOLUTION INTRAVENOUS at 08:30

## 2024-03-28 RX ADMIN — FENTANYL CITRATE 50 MCG: 50 INJECTION INTRAMUSCULAR; INTRAVENOUS at 08:42

## 2024-03-28 RX ADMIN — HYDROMORPHONE HYDROCHLORIDE 0.5 MG: 1 INJECTION, SOLUTION INTRAMUSCULAR; INTRAVENOUS; SUBCUTANEOUS at 09:04

## 2024-03-28 RX ADMIN — SODIUM CHLORIDE, SODIUM LACTATE, POTASSIUM CHLORIDE, AND CALCIUM CHLORIDE 125 ML/HR: .6; .31; .03; .02 INJECTION, SOLUTION INTRAVENOUS at 06:52

## 2024-03-28 RX ADMIN — FENTANYL CITRATE 50 MCG: 50 INJECTION INTRAMUSCULAR; INTRAVENOUS at 08:27

## 2024-03-28 RX ADMIN — PROPOFOL 200 MG: 10 INJECTION, EMULSION INTRAVENOUS at 08:27

## 2024-03-28 RX ADMIN — ONDANSETRON 4 MG: 2 INJECTION INTRAMUSCULAR; INTRAVENOUS at 08:27

## 2024-03-28 NOTE — DISCHARGE INSTR - AVS FIRST PAGE
POSTOPERATIVE INSTRUCTIONS following KNEE SURGERY    MEDICATIONS:  Resume all home medications unless otherwise instructed by your surgeon.  Pain Medication:  Oxycodone 5 mg, 1 tablet every 4 hours as needed  If you were given a regional anesthetic (nerve block), please begin taking the pain medication as soon as you get home, even if you have minimal or no pain.  DO NOT WAIT FOR THE NERVE BLOCK TO WEAR OFF.  Possible side effects include nausea, constipation, and urinary retention.  If you experience these side effects, please call our office for assistance.  Pain med refills are authorized only during office hours (8am-4pm, Mon-Fri).  Anti-Inflammatory:  Tylenol 325 mg, 1-2 tablets every 6 hours and Ibuprofen 600 mg, 1 tablet every 8 hours  Take with food.  Stop if you experience nausea, reflux, or stomach pain.  Nausea Medication:  None  Fill prescription ONLY if you expericnce severe nausea.  Blood Clot Prevention:  Aspirin 81 mg, 1 tablet twice daily for 3 weeks  Pump your foot up and down 20 times per hour while you are less mobile.    WOUND CARE:  Keep the dressing clean and dry.  Light drainage may occur the first 2 days postop.  You may remove the dressings and get the incision wet in the shower 72 hours after surgery.  DO NOT remove steri-strips or sutures.  DO NOT immerse the incision under water.  Carefully pat the incision dry.  If there is wound drainage, re-apply a fresh dry gauze dressing.  Please call our office ([phone]) if you experience either of the following:  Sudden increase in swelling, redness, or warmth at the surgical site  Excessive incisional drainage that persists beyond the 3rd day after surgery  Oral temperature greater than 101 degrees, not relieved with Tylenol  Shortness of breath, chest pain, nausea, or any other concerning symptoms    SWELLING CONTROL:  Cold Therapy:  The cold therapy device may be used either continuously or only as needed, according to your preference.  Do  "not let the pad directly touch your skin.  Alternatively, apply ice (20 min on, 20 min off) as often as you feel is necessary.  Elevation:  Elevate the entire leg above heart level.  Place pillows under your ankle to keep your knee straight.  Compression:  Apply ACE wraps or a thigh-length compression stocking as needed.    RANGE OF MOTION:  You are allowed FULL RANGE OF MOTION as tolerated.    IMMOBILIZATION:  None.  You are allowed full range of motion as tolerated.    ACTIVITY:   BEAR FULL WEIGHT AS TOLERATED on the operative leg.  Use crutches to assist only as needed.  Using Crutches on Stairs:  Going up, lead with your \"good\" (nonoperative) leg.  Going down, lead with your \"bad\" (operative) leg.  Use a hand rail when available.  Knee Extension:  Place a rolled towel or pillow under your ankle for 20-30 minutes 3-5 times per day.  This will help to maintain full knee extension.  Quad Sets:  Sit or lie with your knee straight.  Tighten your quadriceps (front thigh) muscle.  Hold for 3 seconds, then relax.  Repeat 20 times per hour while awake.    PHYSICAL THERAPY:  Begin therapy 3 TO 5 DAYS AFTER SURGERY.  You were given a prescription for therapy at your preoperative office visit.  If you do not have physical therapy scheduled yet, please call our office for assistance.    FOLLOW-UP APPOINTMENT:  7-10 days after surgery with:    BRIDGETTE BetancourtO.  [facility]  [address] [place] [address], [address], [place] [address]  [phone] ([place] Office)  [phone] (After-Hours)    "

## 2024-03-28 NOTE — OP NOTE
OPERATIVE REPORT  PATIENT NAME: Huan Johns    :  1970  MRN: [id number]  Pt Location: [facility] OR ROOM 03    SURGERY DATE: 3/28/2024    Surgeons and Role:     * Ron Mendoza DO - Primary     * Charles Dixon MD - Assisting     * Renea Camacho PA-C - Observing    Preop Diagnosis:  Other tear of medial meniscus, current injury, left knee, initial encounter [S83.242A]    Post-Op Diagnosis Codes:     * Other tear of medial meniscus, current injury, left knee, initial encounter [S83.242A]    Procedure(s):  Left - MENISCECTOMY MEDIAL    Specimen(s):  * No specimens in log *    Estimated Blood Loss:   Minimal    Drains:  * No LDAs found *    Anesthesia Type:   General    Operative Indications:  Other tear of medial meniscus, current injury, left knee, initial encounter [S83.242A]    Complications:   None    Procedure and Technique:      Pre-operative Diagnosis: Left knee medial meniscus complex tear     Post-operative Diagnosis: Left knee medial meniscus complex tear     Operation:  Surgical arthroscopy of the Left knee with partial medial meniscectomy     Anesthesia:  general     Tourniquet Time:  None      Blood Loss:  Minimal      Indications: Mr. Johns is a 53 y.o. male with a medial meniscus tear. An MRI was obtained a revealed a tear of the Left medial meniscus.  Due to the patient's MRI findings, active lifestyle, and lack of improvement with a conservative approach, it was recommended that they proceed forward with arthroscopic surgical management of this problem. We reviewed risks and benefits of surgery and a decision was made to proceed with surgery to address the torn medial meniscus.            Findings:       Examination under anesthesia of the operative Left knee revealed a range of motion of 0-130 degrees. Posterior drawer testing was negative. Lachman testing was negative. Pivot shift testing was negative,  Collateral ligament stability testing revealed no laxity with valgus or varus  stresses. With respect to posterolateral corner testing, dial testing at 30 and at 90 degrees was symmetric to the contralateral knee.     Arthroscopic evaluation of the knee revealed the following:     Medial meniscus: There was a complex, multidirectional tear of the medial meniscus..   Medial femoral condyle:Grade II chondral defects.  Medial tibial plateau: Grade  I chondral defects.   Anterior cruciate ligament: Normal appearance.  Posterior cruciate ligament: Normal appearance.   Lateral meniscus: No tears.  Lateral femoral condyle: Grade I chondral defects.   Lateral tibial plateau: GradeI chondral defects.    Medial and lateral gutters: No loose bodies.   Patella: Grade I chondral defects.   Trochlea: Grade I chondral defects.   Medial plica: No significant plica was present..          Procedure:  In the pre-operative holding area, the patient identified the correct operative extremity and I marked that extremity with my initials, using a permanent marker. The patient was brought to the operating room and positioned supine. Following satisfactory induction of anesthesia, the Left knee was prepped and draped in the usual sterile fashion for surgical arthroscopy of the Left knee. Before any surgical instrumentation was passed to me by the surgical technician, a formalized time-out occurred, which involves the surgeon, circulating nurse, and anesthesia staff all verifying the correct operative extremity. My initials were visible on the prepped and draped operative field.      The anatomic landmarks of the anteromedial and anterolateral portals were marked and these portal sites were injected with 1% lidocaine with epinephrine. Subsequently, 40 mL of 1% lidocaine with epinephrine was injected into the knee through a superolateral puncture. The anterolateral portal was established with a scalpel. The arthroscope was introduced through this portal. Under direct visualization, the anteromedial portal was  established with a localizing needle followed by a scalpel. A probe was then introduced into the anteromedial portal. A systematic diagnostic arthroscopy evaluated the following:  medial compartment, notch, lateral compartment, patellofemoral compartment, medial gutter, and lateral gutter.      A complex medial meniscus tear was noted. This tear was associated with meniscal fragments that were grossly unstable to probing. The medial meniscus tear was debrided to a stable base, using the arthroscopic biters and motorized shaver    There was a large medial plica present that was seen to be impinging on the femoral condyle with flexion and extension and there is significant abrasion of the adjacent femoral condyle cartilage, likely from rubbing from the medial plica.  For this reason a medial plica excision was performed by performing a synovectomy of this tissue with a motorized shaving device.     There was no additional pathology. All particulate debris was removed. The knee was copiously rinsed and then drained. The portals were closed with an interrupted 4-0 monoccryl absorbable suture. The skin was cleansed with sterile saline and dried before Steri-Strips were applied. Finally, a sterile dressing was secured by Webril and an Ace wrap.        I was present for the entire procedure.    Patient Disposition:  PACU         SIGNATURE: Ron Mendoza DO  DATE: March 28, 2024  TIME: 9:31 AM

## 2024-03-28 NOTE — TELEPHONE ENCOUNTER
Caller: [facility]     Doctor: Reji    Reason for call: They needed a dx/type of surgery patient was having in order to fill medication request    Call back#:  n/a

## 2024-03-28 NOTE — ANESTHESIA POSTPROCEDURE EVALUATION
Post-Op Assessment Note    CV Status:  Stable  Pain Score: 0    Pain management: adequate       Mental Status:  Alert and awake   Hydration Status:  Euvolemic   PONV Controlled:  Controlled   Airway Patency:  Patent  Two or more mitigation strategies used for obstructive sleep apnea    Post Op Vitals Reviewed: No    No anethesia notable event occurred.    Staff: Anesthesiologist, CRNA               BP   127/78   Temp      Pulse  72   Resp   14   SpO2   95

## 2024-04-03 ENCOUNTER — OFFICE VISIT (OUTPATIENT)
Dept: OBGYN CLINIC | Facility: MEDICAL CENTER | Age: 54
End: 2024-04-03

## 2024-04-03 VITALS
HEART RATE: 60 BPM | WEIGHT: 196 LBS | SYSTOLIC BLOOD PRESSURE: 116 MMHG | DIASTOLIC BLOOD PRESSURE: 76 MMHG | HEIGHT: 67 IN | BODY MASS INDEX: 30.76 KG/M2

## 2024-04-03 DIAGNOSIS — Z98.890 S/P ARTHROSCOPIC PARTIAL MEDIAL MENISCECTOMY OF LEFT KNEE: Primary | ICD-10-CM

## 2024-04-03 DIAGNOSIS — Z87.828 S/P ARTHROSCOPIC PARTIAL MEDIAL MENISCECTOMY OF LEFT KNEE: Primary | ICD-10-CM

## 2024-04-03 PROCEDURE — 99024 POSTOP FOLLOW-UP VISIT: CPT | Performed by: PHYSICIAN ASSISTANT

## 2024-04-03 NOTE — LETTER
April 3, 2024     Patient: Huan Johns  YOB: 1970  Date of Visit: 4/3/2024      To Whom it May Concern:    Huan Johns is under my professional care. Huan was seen in my office on 4/3/2024. Huan is unable to return to work at this time due to recent Left knee surgery. He will be seen back in the office in 4 weeks in which his work status will be re-evaluated.     If you have any questions or concerns, please don't hesitate to call.         Sincerely,          Renea Camacho PA-C        CC: No Recipients

## 2024-04-03 NOTE — PROGRESS NOTES
Knee Post Operative Visit     Assesment:     53 y.o. male 1 week s/p surgical arthroscopy of the left knee with partial medial meniscectomy, DOS: 3/28/24    Plan:    Post-Operative treatment:    Ice to knee for 20 minutes at least 1-2 times daily.  Start PT per protocol   OTC NSAIDS prn for pain.    Steri-strips were changed in the office today. No incisional concerns. New steri-strips applied. Instructed that they may shower, allowing the warm soapy water to run over the incision and pat dry. If steri-strips fall off on their own, that is fine, if they are still in place in 1 week they are to remove the steri-strips. No soaking, baths, or tubs at this time. No creams ointments or lotions for an additional 3 weeks. Start scar massage.      Imaging:    All imaging from today was reviewed by myself and explained to the patient.     Weight bearing:  as tolerated     ROM:  Full    Brace:  No brace needed    DVT Prophylaxis:  Aspirin 81 mg oral twice daily x 3 weeks post-op  and Ambulation    Follow up:   6 weeks     Patient was advised that if they have any fevers, chills, chest pain, shortness of breath, redness or drainage from the incision, please let our office know immediately.          Chief Complaint   Patient presents with    Left Knee - Post-op     Leg is still swollen. Not bending all the way feels like will break open.       History of Present Illness:    The patient is a 53 y.o. male who is being evaluated post operatively 1 week s/p surgical arthroscopy of the left knee with partial medial meniscectomy, DOS: 3/28/24.    Since the prior visit, He reports improvement. He states that he is no longer feeling the sharp stabbing pain about the medial aspect of the knee.    Pain is well controlled.  The patient is using ice to control swelling.    They have not started physical therapy.     The patient Aspirin 81 mg oral twice daily x 3 weeks post-op and Ambulation for DVT ppx.  The patient has been ambulating  "without crutches.    The patient has been ambulating without a brace.    The patient denies any fevers, chills, calf pain, chest pain/shortness of breath, redness or drainage from the incision.         I have reviewed the past medical, surgical, social and family history, medications and allergies as documented in the EMR.    Review of systems: ROS is negative other than that noted in the HPI.  Constitutional: Negative for fatigue and fever.        Physical Exam:    Height 5' 7\" (1.702 m), weight 88.9 kg (196 lb).    General/Constitutional: NAD, well developed, well nourished  HENT: Normocephalic, atraumatic  CV: Intact distal pulses, regular rate  Resp: No respiratory distress or labored breathing  Lymphatic: No lymphadenopathy palpated  Neuro: Alert and Oriented x 3, no focal deficits  Psych: Normal mood, normal affect, normal judgement, normal behavior  Skin: Warm, dry, no rashes, no erythema      Knee Exam (focused):                  RIGHT LEFT   ROM:   0-130 0-90   Palpation: Effusion negative mild     MJL tenderness Negative Negative     LJL tenderness Negative Negative   Instability: Varus stable stable     Valgus stable stable   Special Tests: Lachman Negative Negative     Posterior drawer Negative Negative     Anterior drawer Negative Negative     Pivot shift not tested not tested     Dial not tested not tested   Patella: Palpation no tenderness no tenderness     Mobility 1/4 1/4     Apprehension Negative Negative   Other: Single leg 1/4 squat not tested not tested      Incisions show no erythema, no drainage    LE NV Exam: +2 DP/PT pulses bilaterally  Sensation intact to light touch L2-S1 bilaterally     Bilateral hip ROM demonstrates no pain actively or passively    No calf tenderness to palpation bilaterally      "

## 2024-04-04 ENCOUNTER — TELEPHONE (OUTPATIENT)
Dept: OBGYN CLINIC | Facility: MEDICAL CENTER | Age: 54
End: 2024-04-04

## 2024-04-04 NOTE — TELEPHONE ENCOUNTER
Caller: [facility]    Doctor/Office: Reji    CB#: [phone]      What needs to be faxed: ALICJA from 4/2    ATTN to: [employer]     Fax#: [phone]      Documents were successfully e-faxed

## 2024-04-09 ENCOUNTER — EVALUATION (OUTPATIENT)
Dept: PHYSICAL THERAPY | Facility: MEDICAL CENTER | Age: 54
End: 2024-04-09
Payer: OTHER MISCELLANEOUS

## 2024-04-09 DIAGNOSIS — Z87.828 S/P ARTHROSCOPIC PARTIAL MEDIAL MENISCECTOMY OF LEFT KNEE: Primary | ICD-10-CM

## 2024-04-09 DIAGNOSIS — Z98.890 S/P ARTHROSCOPIC PARTIAL MEDIAL MENISCECTOMY OF LEFT KNEE: Primary | ICD-10-CM

## 2024-04-09 PROCEDURE — 97161 PT EVAL LOW COMPLEX 20 MIN: CPT | Performed by: PHYSICAL THERAPIST

## 2024-04-10 NOTE — PROGRESS NOTES
PT Evaluation     Today's date: 4/10/2024  Patient name: Huan Johns  : 1970  MRN: [id number]  Referring provider: Renea Camacho,*  Dx:   Encounter Diagnosis     ICD-10-CM    1. S/P arthroscopic partial medial meniscectomy of left knee  Z98.890 Ambulatory Referral to Physical Therapy    Z87.828                      Assessment  Assessment details: Huan Johns is a 53 y.o. male was evaluated on 4/10/2024  for S/P arthroscopic partial medial meniscectomy of left knee  (primary encounter diagnosis). Huan Johns has the above listed impairments resulting in functional deficits and negative impact to quality of life.  Patient is appropriate for skilled PT intervention to promote maximal return to function and patient specific goals.      Patient agrees with outlined treatment plan and all questions were answered to their satisfaction.      Impairments: abnormal muscle firing, abnormal muscle tone, abnormal or restricted ROM, impaired physical strength, lacks appropriate home exercise program and pain with function  Understanding of Dx/Px/POC: good   Prognosis: good    Goals  Patient will successfully transition to home exercise program.  Patient will be able to manage symptoms independently.    Huan will report no limitation in walking tolerance   Huan will report no limitation in stair climbing or descending   Huan will report no limitation in work activity.     Plan  Patient would benefit from: skilled PT  Referral necessary: No  Planned modality interventions: thermotherapy: hydrocollator packs  Planned therapy interventions: home exercise program, manual therapy, neuromuscular re-education, patient education, functional ROM exercises, strengthening, stretching, joint mobilization, graded activity, graded exercise, therapeutic exercise, body mechanics training, motor coordination training and activity modification  Frequency: 1x week  Duration in weeks: 12  Treatment plan discussed with:  patient        Subjective Evaluation    History of Present Illness  Mechanism of injury: Huan Johns is a 53 y.o. male presenting to therapy s/p L knee arthroscopy.   He suffered work injury when jumping down from a truck and landed wrong, felt pain in his knee.  He had MRI indicating partial meniscus tear and underwent surgery on on 3/28.  He currently is ambulating with no AD, doing well in general .  Notes some stiffness and soreness in his knee, particularly after activity.   He is hoping to get back to work and is required to lift large items of 100lbs at times.  Patient Goals  Patient goals for therapy: decreased pain, increased motion, independence with ADLs/IADLs, increased strength, return to sport/leisure activities and return to work    Pain  Current pain rating: 3  At best pain ratin  At worst pain ratin  Quality: dull ache, discomfort, tight and pressure          Objective     Observations   Left Knee   Positive for incision. Negative for drainage.     Active Range of Motion   Left Knee   Flexion: 110 degrees   Extension: 0 degrees     Right Knee   Normal active range of motion    Mobility   Patellar Mobility:   Left Knee   Hypomobile: left superior and left inferior    Patellar Static Positioning   Left Knee: WFL  Right Knee: WFL    Strength/Myotome Testing     Left Hip   Planes of Motion   Flexion: 4  Extension: 4  Adduction: 4    Right Hip   Planes of Motion   Flexion: 4+  Extension: 4+  Abduction: 4+  Adduction: 4+    Left Knee   Flexion: 4  Prone flexion: 4  Extension: 4  Quadriceps contraction: fair    Right Knee   Flexion: 4+  Prone flexion: 4+  Extension: 4+             Precautions: None      Manuals                                                                 Neuro Re-Ed                                                                                                        Ther Ex             Quad set             Heel slides               SLR flexion and abduction              Hamstring stretch                                                                 Ther Activity                                       Gait Training                                       Modalities

## 2024-04-16 ENCOUNTER — OFFICE VISIT (OUTPATIENT)
Dept: PHYSICAL THERAPY | Facility: MEDICAL CENTER | Age: 54
End: 2024-04-16
Payer: OTHER MISCELLANEOUS

## 2024-04-16 DIAGNOSIS — Z87.828 S/P ARTHROSCOPIC PARTIAL MEDIAL MENISCECTOMY OF LEFT KNEE: Primary | ICD-10-CM

## 2024-04-16 DIAGNOSIS — Z98.890 S/P ARTHROSCOPIC PARTIAL MEDIAL MENISCECTOMY OF LEFT KNEE: Primary | ICD-10-CM

## 2024-04-16 PROCEDURE — 97110 THERAPEUTIC EXERCISES: CPT | Performed by: PHYSICAL THERAPIST

## 2024-04-16 PROCEDURE — 97112 NEUROMUSCULAR REEDUCATION: CPT | Performed by: PHYSICAL THERAPIST

## 2024-04-16 NOTE — PROGRESS NOTES
"Daily Note     Today's date: 2024  Patient name: Huan Johns  : 1970  MRN: [id number]  Referring provider: Renea Camacho,*  Dx:   Encounter Diagnosis     ICD-10-CM    1. S/P arthroscopic partial medial meniscectomy of left knee  Z98.890     Z87.828           Start Time: 915  Stop Time: 1005  Total time in clinic (min): 50 minutes    Subjective: Bryson reports that he is doing well today. He reports that he was sitting for 2 hours this weekend and when he tried to get up to walk he had a lot of pain in his knee.     Objective: See treatment diary below      Assessment: Tolerated treatment well. Patient exhibited good technique with therapeutic exercises and fatigued appropriately with exercise. Bryson reports mild anterior left knee pain during SLR. Bryson demonstrates good quad activation and control during SAQs and SLRs. Bryson reports that he feels clicking in his left knee during PB squats at the wall, the clicking is not painful. Bryson notes that his knee feels tight after exercise and says he will apply ice when he gets home.       Plan: Continue per plan of care.      Precautions: None      Manuals                                                                Neuro Re-Ed                                                                                                        Ther Ex             Quad set             SAQ  30x           Heel slides    5 sec 20x           SLR flexion and abduction  30x           Hamstring stretch  10 sec 10x           Bike  10 min           PB squats at wall  30x           Step ups  6\" 30x                        Ther Activity                                       Gait Training                                       Modalities                                            "

## 2024-04-23 ENCOUNTER — OFFICE VISIT (OUTPATIENT)
Dept: PHYSICAL THERAPY | Facility: MEDICAL CENTER | Age: 54
End: 2024-04-23
Payer: OTHER MISCELLANEOUS

## 2024-04-23 DIAGNOSIS — Z98.890 S/P ARTHROSCOPIC PARTIAL MEDIAL MENISCECTOMY OF LEFT KNEE: Primary | ICD-10-CM

## 2024-04-23 DIAGNOSIS — Z87.828 S/P ARTHROSCOPIC PARTIAL MEDIAL MENISCECTOMY OF LEFT KNEE: Primary | ICD-10-CM

## 2024-04-23 PROCEDURE — 97112 NEUROMUSCULAR REEDUCATION: CPT | Performed by: PHYSICAL THERAPIST

## 2024-04-23 PROCEDURE — 97110 THERAPEUTIC EXERCISES: CPT | Performed by: PHYSICAL THERAPIST

## 2024-04-23 NOTE — PROGRESS NOTES
"Daily Note     Today's date: 2024  Patient name: Huan Johns  : 1970  MRN: [id number]  Referring provider: Renea Camacho,*  Dx:   Encounter Diagnosis     ICD-10-CM    1. S/P arthroscopic partial medial meniscectomy of left knee  Z98.890     Z87.828             Start Time: 0900  Stop Time: 09  Total time in clinic (min): 45 minutes    Subjective: Bryson reports that he is doing okay today but he is a bit worried about the clicking (painless) and swelling in his knee. He also reports that the hamstring stretch is bothering his sciatic and he is having some low back pain. He goes to the [facility] a couple times a week and rides the bike and is compliant with his HEP.    Objective: See treatment diary below      Assessment: Tolerated treatment well. Patient exhibited good technique with therapeutic exercises and fatigued appropriately with exercise. Bryson is very focused on the clicking and swelling in his knee and he reports feeling looseness around his knee. Bryson demonstrates good quad activation during exercises, continue to progress strengthening as he is able to tolerate. Bryson achieves good knee flexion ROM during heel slides, similar to contralateral side. Did not complete hamstring stretches today due to sciatic pain Bryson was reporting, showed him an extension exercise he can do at home when feeling the pain. Added TKE on the kieser, tolerated well.       Plan: Continue per plan of care.      Precautions: None      Manuals                                                               Neuro Re-Ed                                                                                                        Ther Ex             Quad set             SAQ  30x           Heel slides    5 sec 20x 5 sec x20          SLR flexion and abduction  30x 30x          Hamstring stretch  10 sec 10x           Bike  10 min 10 min          PB squats at wall  30x 30x          Step ups  6\" 30x 6\" x30          TKE " on [place]   10# x30          Ther Activity                                       Gait Training                                       Modalities

## 2024-04-30 ENCOUNTER — APPOINTMENT (OUTPATIENT)
Dept: PHYSICAL THERAPY | Facility: MEDICAL CENTER | Age: 54
End: 2024-04-30
Payer: OTHER MISCELLANEOUS

## 2024-05-01 ENCOUNTER — OFFICE VISIT (OUTPATIENT)
Dept: PHYSICAL THERAPY | Facility: MEDICAL CENTER | Age: 54
End: 2024-05-01
Payer: OTHER MISCELLANEOUS

## 2024-05-01 DIAGNOSIS — Z87.828 S/P ARTHROSCOPIC PARTIAL MEDIAL MENISCECTOMY OF LEFT KNEE: Primary | ICD-10-CM

## 2024-05-01 DIAGNOSIS — Z98.890 S/P ARTHROSCOPIC PARTIAL MEDIAL MENISCECTOMY OF LEFT KNEE: Primary | ICD-10-CM

## 2024-05-01 PROCEDURE — 97110 THERAPEUTIC EXERCISES: CPT | Performed by: PHYSICAL THERAPIST

## 2024-05-01 PROCEDURE — 97112 NEUROMUSCULAR REEDUCATION: CPT | Performed by: PHYSICAL THERAPIST

## 2024-05-01 NOTE — PROGRESS NOTES
Daily Note     Today's date: 2024  Patient name: Huan Johns  : 1970  MRN: [id number]  Referring provider: Renea Camacho,*  Dx:   Encounter Diagnosis     ICD-10-CM    1. S/P arthroscopic partial medial meniscectomy of left knee  Z98.890     Z87.828               Start Time: 1000  Stop Time: 1040  Total time in clinic (min): 40 minutes    Subjective: Bryson reports that he is doing good today, he played soccer yesterday and was able to kick the ball with his left foot which he is happy about. He still mentions that he has constant clicking that is nonpainful and his knee gets swollen after being on his feet all day. Bryson also reports that he cannot kneel on his left knee and says that he needs to be able to do that for work. He also says he does not like going down steps with his left foot first because he still feels that his knee is weak.     Objective: See treatment diary below      Assessment: Tolerated treatment well. Patient exhibited good technique with therapeutic exercises and fatigued appropriately with exercise. Bryson is still very focused on the nonpainful clicking in his left knee during activity. Bryson demonstrates good knee flexion ROM, equal to contralateral side, no restrictions other then clicking. Tolerated added 2# weight to SLR and hip abduction well, Bryson demonstrates good strength. Added sit to stands today, tolerated well. Added lateral step downs, Bryson reports this felt more challenging for him. Bryson has follow up visit schedule with his doctor tomorrow.       Plan: Continue per plan of care.      Precautions: None      Manuals                                                              Neuro Re-Ed                                                                                                        Ther Ex             Heel slides    5 sec 20x 5 sec x20 5 sec x20         SLR flexion and abduction  30x 30x 2# 30x         Hamstring stretch  10 sec 10x  10  "sec x10         Bike  10 min 10 min 10 min         PB squats at wall  30x 30x          Sit to stands    30x         Step ups  6\" 30x 6\" x30 6\" 30x         Lateral steps downs    6\" 30x         TKE on [place]   10# x30          Ther Activity                                       Gait Training                                       Modalities                                            "

## 2024-05-02 ENCOUNTER — OFFICE VISIT (OUTPATIENT)
Dept: OBGYN CLINIC | Facility: MEDICAL CENTER | Age: 54
End: 2024-05-02

## 2024-05-02 VITALS
HEART RATE: 61 BPM | DIASTOLIC BLOOD PRESSURE: 73 MMHG | BODY MASS INDEX: 30.45 KG/M2 | HEIGHT: 67 IN | WEIGHT: 194 LBS | SYSTOLIC BLOOD PRESSURE: 111 MMHG

## 2024-05-02 DIAGNOSIS — Z98.890 S/P ARTHROSCOPIC PARTIAL MEDIAL MENISCECTOMY OF LEFT KNEE: Primary | ICD-10-CM

## 2024-05-02 DIAGNOSIS — Z87.828 S/P ARTHROSCOPIC PARTIAL MEDIAL MENISCECTOMY OF LEFT KNEE: Primary | ICD-10-CM

## 2024-05-02 PROCEDURE — 99024 POSTOP FOLLOW-UP VISIT: CPT | Performed by: ORTHOPAEDIC SURGERY

## 2024-05-02 NOTE — PROGRESS NOTES
Knee Post Operative Visit     Assesment:     53 y.o. male 5 weeks s/p surgical arthroscopy of the left knee with partial medial meniscectomy, DOS: 3/28/24     Plan:    Post-Operative treatment:    Ice to knee for 20 minutes at least 1-2 times daily.  OTC NSAIDS prn for pain.  Continue with PT per protocol.   Work note provided to remain out of work. Patient may call if he would like to return prior to his appointment in six weeks.     Imaging:    All imaging from today was reviewed by myself and explained to the patient.   No imaging was available for review today.    Weight bearing:  as tolerated     ROM:  Full    Brace:  No brace needed    DVT Prophylaxis:  Ambulation    Follow up:   6 weeks     Patient was advised that if they have any fevers, chills, chest pain, shortness of breath, redness or drainage from the incision, please let our office know immediately.          Chief Complaint   Patient presents with    Left Knee - Post-op       History of Present Illness:    The patient is a 53 y.o. male who is being evaluated post operatively 5 weeks  status post surgical arthroscopy of the left knee with partial medial meniscectomy, DOS: 3/28/24.      Since the prior visit, He reports improvement. Patient denies any significant pain with standing or walking. Patient reports he only has pain in the anterior aspect of the knee with kneeling. Patient is not taking any oral analgesics.      The patient is using ice to control swelling.    They have started physical therapy and is attending one weekly as well as doing at home exercises.      The patient Ambulation for DVT ppx.  The patient has been ambulating without crutches.    The patient has been ambulating without a brace.    The patient denies any fevers, chills, calf pain, chest pain/shortness of breath, redness or drainage from the incision.         I have reviewed the past medical, surgical, social and family history, medications and allergies as documented in the  "EMR.    Review of systems: ROS is negative other than that noted in the HPI.  Constitutional: Negative for fatigue and fever.        Physical Exam:    Blood pressure 111/73, pulse 61, height 5' 7\" (1.702 m), weight 88 kg (194 lb).    General/Constitutional: NAD, well developed, well nourished  HENT: Normocephalic, atraumatic  CV: Intact distal pulses, regular rate  Resp: No respiratory distress or labored breathing  Lymphatic: No lymphadenopathy palpated  Neuro: Alert and Oriented x 3, no focal deficits  Psych: Normal mood, normal affect, normal judgement, normal behavior  Skin: Warm, dry, no rashes, no erythema      Knee Exam (focused):                   LEFT   ROM:    0-130   Palpation: Effusion  negative     MJL tenderness  Not Applicable     LJL tenderness  Not Applicable   Instability: Varus  stable     Valgus  stable   Special Tests: Lachman  Not Applicable     Posterior drawer  Not Applicable     Anterior drawer  Not Applicable     Pivot shift  not tested     Dial  not tested   Patella: Palpation  no tenderness     Mobility  1/4     Apprehension  Not Applicable   Other: Single leg 1/4 squat  not tested      Incisions show no erythema, no drainage    LE NV Exam: +2 DP/PT pulses bilaterally  Sensation intact to light touch L2-S1 bilaterally     Bilateral hip ROM demonstrates no pain actively or passively    No calf tenderness to palpation bilaterally    Scribe Attestation      I,:   am acting as a scribe while in the presence of the attending physician.:       I,:   personally performed the services described in this documentation    as scribed in my presence.:             "

## 2024-05-02 NOTE — LETTER
May 2, 2024     Patient: Huan Johns  YOB: 1970  Date of Visit: 5/2/2024      To Whom it May Concern:    Huan Johns is under my professional care. Huan was seen in my office on 5/2/2024. Huan should remain out of work at this time until cleared to return. Patient will be seen for follow-up appointment in six weeks.     If you have any questions or concerns, please don't hesitate to call.         Sincerely,          Ron Mendoza DO        CC: No Recipients

## 2024-05-06 ENCOUNTER — TELEPHONE (OUTPATIENT)
Age: 54
End: 2024-05-06

## 2024-05-06 NOTE — TELEPHONE ENCOUNTER
Caller: Tam VELASCO    Doctor/Office: Reji    CB#:        What needs to be faxed: OVN from 5/2/24    ATTN to: [employer]#[id number]     Fax#: [phone]      Documents were successfully e-faxed---on 5/6/24

## 2024-05-07 ENCOUNTER — OFFICE VISIT (OUTPATIENT)
Dept: PHYSICAL THERAPY | Facility: MEDICAL CENTER | Age: 54
End: 2024-05-07
Payer: OTHER MISCELLANEOUS

## 2024-05-07 DIAGNOSIS — Z87.828 S/P ARTHROSCOPIC PARTIAL MEDIAL MENISCECTOMY OF LEFT KNEE: Primary | ICD-10-CM

## 2024-05-07 DIAGNOSIS — Z98.890 S/P ARTHROSCOPIC PARTIAL MEDIAL MENISCECTOMY OF LEFT KNEE: Primary | ICD-10-CM

## 2024-05-07 PROCEDURE — 97110 THERAPEUTIC EXERCISES: CPT | Performed by: PHYSICAL THERAPIST

## 2024-05-07 NOTE — PROGRESS NOTES
"Daily Note     Today's date: 2024  Patient name: Huan Johns  : 1970  MRN: [id number]  Referring provider: Renea Camacho,*  Dx:   Encounter Diagnosis     ICD-10-CM    1. S/P arthroscopic partial medial meniscectomy of left knee  Z98.890     Z87.828                 Start Time: 0900  Stop Time: 0940    Total time in clinic (min): 40 minutes  Subjective: Bryson reports that he is doing okay today. Had his follow up with the doctor and things went well.Bryson still reports that he feels that his knee is swollen all the time and he feels resistance when bending his knee. Bryson reports he is worried about working because he cannot squat deeply or kneel on his left knee, which he reports he has to do a lot.      Objective: See treatment diary below      Assessment: Tolerated treatment well. Patient exhibited good technique with therapeutic exercises and fatigued appropriately with exercise. Discontinued hamstring stretch and heel  slides since patient is at full knee ROM comparable to contralateral side. Added more strengthening exercises and dynamic movements today, Bryson tolerated well. Bryson said it felt okay to kneel on the foam pad with both knees.      Plan: Continue per plan of care.      Precautions: None      Manuals                                                             Neuro Re-Ed                                                                                                        Ther Ex             SLR flexion and abduction  30x 30x 2# 30x         Bike  10 min 10 min 10 min 10 min        PB squats at wall  30x 30x          Sit to stands    30x 10# 30x        Step ups  6\" 30x 6\" x30 6\" 30x 8\" 10# 30x        Lateral steps downs    6\" 30x 6\" 30x        Kneeling on foam pad/split squats     20x        Leg press     50# 30x        Sled pushes     50# 5x        Ther Activity                                       Gait Training                                       Modalities "

## 2024-05-14 ENCOUNTER — OFFICE VISIT (OUTPATIENT)
Dept: PHYSICAL THERAPY | Facility: MEDICAL CENTER | Age: 54
End: 2024-05-14
Payer: OTHER MISCELLANEOUS

## 2024-05-14 DIAGNOSIS — Z87.828 S/P ARTHROSCOPIC PARTIAL MEDIAL MENISCECTOMY OF LEFT KNEE: Primary | ICD-10-CM

## 2024-05-14 DIAGNOSIS — Z98.890 S/P ARTHROSCOPIC PARTIAL MEDIAL MENISCECTOMY OF LEFT KNEE: Primary | ICD-10-CM

## 2024-05-14 PROCEDURE — 97110 THERAPEUTIC EXERCISES: CPT | Performed by: PHYSICAL THERAPIST

## 2024-05-14 PROCEDURE — 97112 NEUROMUSCULAR REEDUCATION: CPT | Performed by: PHYSICAL THERAPIST

## 2024-05-14 NOTE — PROGRESS NOTES
"Daily Note     Today's date: 2024  Patient name: Huan Johns  : 1970  MRN: [id number]  Referring provider: Renea Camacho,*  Dx:   Encounter Diagnosis     ICD-10-CM    1. S/P arthroscopic partial medial meniscectomy of left knee  Z98.890     Z87.828           Start Time: 0900  Stop Time: 09  Total time in clinic (min): 45 minutes      Subjective: Bryson reports that he is feeling frustrated today. He says that he is still having a lot of swelling and was expecting to be able to return to work sooner. He reports that he feels very restricted especially when kneeling on L knee and is hesitant to kick a soccer ball.      Objective: See treatment diary below      Assessment: Tolerated treatment well. Patient exhibited good technique with therapeutic exercises and fatigued appropriately with exercise. Added in single leg squat to help increase LE strength. Bryson is very aware of the swelling in his knee, but reassured him that this is normal to expect. Bryson does well with eccentric control during exercises and has made good progress with his strength. Bryson reports his knee and left leg are feeling tired after exercises today, says he will ice when he gets home.       Plan: Continue per plan of care.      Precautions: None      Manuals                                                            Neuro Re-Ed                                                                                                        Ther Ex             SLR flexion and abduction  30x 30x 2# 30x         Bike  10 min 10 min 10 min 10 min 10 min       PB squats at wall  30x 30x          SL squat to box      15x       Sit to stands    30x 10# 30x 15# 30x       Step ups  6\" 30x 6\" x30 6\" 30x 8\" 10# 30x 8\" 10# 30x       Lateral steps downs    6\" 30x 6\" 30x 8\" 30x       Kneeling on foam pad/split squats     20x 30x       Leg press     50# 30x 60# 30x       Sled pushes     50# 5x        Ther Activity           "                             Gait Training                                       Modalities

## 2024-05-21 ENCOUNTER — TELEPHONE (OUTPATIENT)
Dept: PAIN MEDICINE | Facility: MEDICAL CENTER | Age: 54
End: 2024-05-21

## 2024-05-21 ENCOUNTER — OFFICE VISIT (OUTPATIENT)
Dept: PHYSICAL THERAPY | Facility: MEDICAL CENTER | Age: 54
End: 2024-05-21
Payer: OTHER MISCELLANEOUS

## 2024-05-21 DIAGNOSIS — Z87.828 S/P ARTHROSCOPIC PARTIAL MEDIAL MENISCECTOMY OF LEFT KNEE: Primary | ICD-10-CM

## 2024-05-21 DIAGNOSIS — Z98.890 S/P ARTHROSCOPIC PARTIAL MEDIAL MENISCECTOMY OF LEFT KNEE: Primary | ICD-10-CM

## 2024-05-21 PROCEDURE — 97110 THERAPEUTIC EXERCISES: CPT | Performed by: PHYSICAL THERAPIST

## 2024-05-21 NOTE — PROGRESS NOTES
"Daily Note     Today's date: 2024  Patient name: Huan Johns  : 1970  MRN: [id number]  Referring provider: Renea Camacho,*  Dx:   Encounter Diagnosis     ICD-10-CM    1. S/P arthroscopic partial medial meniscectomy of left knee  Z98.890     Z87.828             Start Time: 0915  Stop Time: 1000  Total time in clinic (min): 45 minutes      Subjective: Bryson reports that he is doing fine today.  He still is having discomfort inn his knee and feel likes \"there is water in there\". He also reports the clicking in his knee (nonpainful) and wants to follow up with Doctor because he wants to go back to work and feels that he cannot.    Objective: See treatment diary below      Assessment: Tolerated treatment well. Patient exhibited good technique with therapeutic exercises and fatigued appropriately with exercise. Bryson continues to do well with exercises. Reports mild discomfort during lateral step downs. Single leg sit to stands are very challenging for Bryson and he must use his arms to assist him, adjust this exercise next time to make sure he focuses on quadriceps strengthening. Bryson has made good progress with his strength, continue to progress to more functional exercises as able. Bryson reports after exercising that his muscles feel tired and he feels that he cannot do much the rest of the day.      Plan: Continue per plan of care.      Precautions: None      Manuals                                                                 Neuro Re-Ed                                                                                                        Ther Ex             Bike 10 min            PB squats at wall 30x 15#            SL squat to box 30x            Sit to stands             Step ups 8\" 15# 30x            Lateral steps downs 8\" 30x            Reverse lunges onto foam 30x            Leg press SL 50# 30x            Sled pushes             Ther Activity                                       Gait " Training                                       Modalities

## 2024-05-22 ENCOUNTER — OFFICE VISIT (OUTPATIENT)
Dept: OBGYN CLINIC | Facility: MEDICAL CENTER | Age: 54
End: 2024-05-22

## 2024-05-22 VITALS
SYSTOLIC BLOOD PRESSURE: 127 MMHG | HEART RATE: 66 BPM | BODY MASS INDEX: 30.76 KG/M2 | DIASTOLIC BLOOD PRESSURE: 84 MMHG | HEIGHT: 67 IN | WEIGHT: 196 LBS

## 2024-05-22 DIAGNOSIS — Z87.828 S/P ARTHROSCOPIC PARTIAL MEDIAL MENISCECTOMY OF LEFT KNEE: Primary | ICD-10-CM

## 2024-05-22 DIAGNOSIS — Z98.890 S/P ARTHROSCOPIC PARTIAL MEDIAL MENISCECTOMY OF LEFT KNEE: Primary | ICD-10-CM

## 2024-05-22 PROCEDURE — 99024 POSTOP FOLLOW-UP VISIT: CPT | Performed by: ORTHOPAEDIC SURGERY

## 2024-05-22 NOTE — PROGRESS NOTES
"Knee Post Operative Visit     Assesment:     54 y.o. male 2 months s/p surgical arthroscopy of the left knee with partial medial meniscectomy, DOS: 3/28/24      Plan:    Post-Operative treatment:    Ice to knee for 20 minutes at least 1-2 times daily.  PT for ROM/strengthening to knee, hip and core.  OTC NSAIDS prn for pain.  Let pain guide gradual return activities.    Imaging:    No imaging was available for review today.    Weight bearing:  as tolerated     ROM:  Full    Brace:  No brace needed    DVT Prophylaxis:  Ambulation    Follow up:   6 weeks      Chief Complaint   Patient presents with    Left Knee - Post-op       History of Present Illness:    The patient is a 54 y.o. male who is being evaluated post operatively 2 months  status post surgical arthroscopy of the left knee with partial medial meniscectomy, DOS: 3/28/24.    Since the prior visit, He reports minimal improvement. He is concerned with the swelling that is present.    Pain is well controlled.  The patient is using ice to control swelling.    They have started physical therapy.     The patient Ambulation for DVT ppx.  The patient has been ambulating without crutches.    The patient has been ambulating without a brace.    The patient denies any fevers, chills, calf pain, chest pain/shortness of breath, redness or drainage from the incision.         I have reviewed the past medical, surgical, social and family history, medications and allergies as documented in the EMR.    Review of systems: ROS is negative other than that noted in the HPI.  Constitutional: Negative for fatigue and fever.        Physical Exam:    Blood pressure 127/84, pulse 66, height 5' 7\" (1.702 m), weight 88.9 kg (196 lb).    General/Constitutional: NAD, well developed, well nourished  HENT: Normocephalic, atraumatic  CV: Intact distal pulses, regular rate  Resp: No respiratory distress or labored breathing  Lymphatic: No lymphadenopathy palpated  Neuro: Alert and Oriented x " 3, no focal deficits  Psych: Normal mood, normal affect, normal judgement, normal behavior  Skin: Warm, dry, no rashes, no erythema      Knee Exam (focused):                  RIGHT LEFT   ROM:   0-130 0-120   Palpation: Effusion negative trace     MJL tenderness Negative Negative     LJL tenderness Negative Negative   Instability: Varus stable stable     Valgus stable stable   Special Tests: Lachman Negative Negative     Posterior drawer Negative Negative     Anterior drawer Negative Negative     Pivot shift not tested not tested     Dial not tested not tested   Patella: Palpation no tenderness no tenderness     Mobility 1/4 1/4     Apprehension Negative Negative   Other: Single leg 1/4 squat not tested not tested      Incisions show no erythema, no drainage    LE NV Exam: +2 DP/PT pulses bilaterally  Sensation intact to light touch L2-S1 bilaterally     Bilateral hip ROM demonstrates no pain actively or passively    No calf tenderness to palpation bilaterally    Scribe Attestation      I,:  Ciarra Moss am acting as a scribe while in the presence of the attending physician.:       I,:  Ron Mendoza, DO personally performed the services described in this documentation    as scribed in my presence.:

## 2024-05-28 ENCOUNTER — OFFICE VISIT (OUTPATIENT)
Dept: PHYSICAL THERAPY | Facility: MEDICAL CENTER | Age: 54
End: 2024-05-28
Payer: OTHER MISCELLANEOUS

## 2024-05-28 DIAGNOSIS — Z87.828 S/P ARTHROSCOPIC PARTIAL MEDIAL MENISCECTOMY OF LEFT KNEE: Primary | ICD-10-CM

## 2024-05-28 DIAGNOSIS — Z98.890 S/P ARTHROSCOPIC PARTIAL MEDIAL MENISCECTOMY OF LEFT KNEE: Primary | ICD-10-CM

## 2024-05-28 PROCEDURE — 97110 THERAPEUTIC EXERCISES: CPT | Performed by: PHYSICAL THERAPIST

## 2024-05-28 PROCEDURE — 97112 NEUROMUSCULAR REEDUCATION: CPT | Performed by: PHYSICAL THERAPIST

## 2024-05-28 NOTE — PROGRESS NOTES
"Daily Note     Today's date: 2024  Patient name: Huan Johns  : 1970  MRN: [id number]  Referring provider: Renea Camacho,*  Dx:   Encounter Diagnosis     ICD-10-CM    1. S/P arthroscopic partial medial meniscectomy of left knee  Z98.890     Z87.828               Start Time: 0910  Stop Time: 1000  Total time in clinic (min): 50 minutes      Subjective: Bryson reports that he saw his doctor last week. He is still bothered by the clicking in his knee and that it feels like there is \"water inside his knee\" and it is always \"stiff and swollen\".    Objective: See treatment diary below      Assessment: Tolerated treatment well. Patient exhibited good technique with therapeutic exercises and fatigued appropriately with exercise. Bryson continues to do well with exercises and progress his strength. Bryson remains hyperfocused on the clicking in his knee and states that sometimes when performing exercises very slowly he gets a sharp pain on the medial aspect of his knee. Bryson reports after exercising that his muscles feel tired.       Plan: Continue per plan of care.      Precautions: None      Manuals                                                                Neuro Re-Ed                                                                                                        Ther Ex             Bike 10 min 10 min           PB squats at wall 30x 15# 30x 10#           SL squat to box 30x DL   30x           Sit to stands             Step ups 8\" 15# 30x 8\" 15# 30x           Lateral steps downs 8\" 30x 8\" 30x 15#           Reverse lunges onto foam 30x 30x           Leg press SL 50# 30x DL 70# 30x           TKE at chapin  15# 30x           Sled pushes             Ther Activity                                       Gait Training                                       Modalities                                            "

## 2024-06-04 ENCOUNTER — APPOINTMENT (OUTPATIENT)
Dept: PHYSICAL THERAPY | Facility: MEDICAL CENTER | Age: 54
End: 2024-06-04
Payer: OTHER MISCELLANEOUS

## 2024-06-11 ENCOUNTER — OFFICE VISIT (OUTPATIENT)
Dept: PHYSICAL THERAPY | Facility: MEDICAL CENTER | Age: 54
End: 2024-06-11
Payer: OTHER MISCELLANEOUS

## 2024-06-11 DIAGNOSIS — Z98.890 S/P ARTHROSCOPIC PARTIAL MEDIAL MENISCECTOMY OF LEFT KNEE: Primary | ICD-10-CM

## 2024-06-11 DIAGNOSIS — Z87.828 S/P ARTHROSCOPIC PARTIAL MEDIAL MENISCECTOMY OF LEFT KNEE: Primary | ICD-10-CM

## 2024-06-11 PROCEDURE — 97110 THERAPEUTIC EXERCISES: CPT | Performed by: PHYSICAL THERAPIST

## 2024-06-11 NOTE — PROGRESS NOTES
"Daily Note     Today's date: 2024  Patient name: Huan Johns  : 1970  MRN: [id number]  Referring provider: Renea Camacho,*  Dx:   Encounter Diagnosis     ICD-10-CM    1. S/P arthroscopic partial medial meniscectomy of left knee  Z98.890     Z87.828               Start Time: 08  Stop Time: 915  Total time in clinic (min): 40 minutes      Subjective: Bryson reports that he is still having some pain in his knee while playing soccer and it feels \"tight\" Bryson reports that overall he believes his knee is very slowly getting better. He wishes to return to work soon.     Objective: See treatment diary below      Assessment: Tolerated treatment well. Patient exhibited good technique with therapeutic exercises and fatigued appropriately with exercise. Bryson demonstrates proper strength and motion in his left knee. Bryson is limited by the clicking in his knee that bothers him and the inability to kneel fully on his left knee like normal. Bryson reports after exercising that his muscles feel tired.       Plan: Continue per plan of care.      Precautions: None      Manuals                                                               Neuro Re-Ed                                                                                                        Ther Ex             Bike 10 min 10 min 10 min          PB squats at wall 30x 15# 30x 10#           SL squat to box 30x DL   30x           Sit to stands   10# 30x          Step ups 8\" 15# 30x 8\" 15# 30x 8\" 10# 30x          Lateral steps downs 8\" 30x 8\" 30x 15# 8\" 30x          Reverse lunges onto foam 30x 30x           Leg press SL 50# 30x DL 70# 30x SL 50# 30x          TKE at chapin  15# 30x           Walking lunges   5x          Ther Activity                                       Gait Training                                       Modalities                                            "

## 2024-06-18 ENCOUNTER — OFFICE VISIT (OUTPATIENT)
Dept: PHYSICAL THERAPY | Facility: MEDICAL CENTER | Age: 54
End: 2024-06-18
Payer: OTHER MISCELLANEOUS

## 2024-06-18 DIAGNOSIS — Z98.890 S/P ARTHROSCOPIC PARTIAL MEDIAL MENISCECTOMY OF LEFT KNEE: Primary | ICD-10-CM

## 2024-06-18 DIAGNOSIS — Z87.828 S/P ARTHROSCOPIC PARTIAL MEDIAL MENISCECTOMY OF LEFT KNEE: Primary | ICD-10-CM

## 2024-06-18 PROCEDURE — 97110 THERAPEUTIC EXERCISES: CPT | Performed by: PHYSICAL THERAPIST

## 2024-06-18 NOTE — PROGRESS NOTES
"Daily Note     Today's date: 2024  Patient name: Huan Johns  : 1970  MRN: [id number]  Referring provider: Renea Camacho,*  Dx:   Encounter Diagnosis     ICD-10-CM    1. S/P arthroscopic partial medial meniscectomy of left knee  Z98.890     Z87.828               Start Time: 0830  Stop Time: 0915  Total time in clinic (min): 45 minutes      Subjective: Bryson reports that he is doing okay today. Still having pain with kneeling on left knee for prolonged time.      Objective: See treatment diary below      Assessment: Tolerated treatment well. Patient exhibited good technique with therapeutic exercises and fatigued appropriately with exercise. Bryson demonstrates proper strength and motion in his left knee during walking lunges. SL squats challenge Bryson. Bryson has made good progress in PT. He plans to return to work .       Plan: Continue per plan of care.      Precautions: None      Manuals                                                              Neuro Re-Ed                                                                                                        Ther Ex             Bike 10 min 10 min 10 min 10 min         Standing hip abd/ext    Green 30x         PB squats at wall 30x 15# 30x 10#  30x 10#         SL squat to box 30x 30x  30x         Sit to stands   10# 30x          Step ups 8\" 15# 30x 8\" 15# 30x 8\" 10# 30x 8\" 20#30x         Lateral steps downs 8\" 30x 8\" 30x 15# 8\" 30x 8\" 30x 20#         Reverse lunges onto foam 30x 30x           Leg press SL 50# 30x DL 70# 30x SL 50# 30x SL 60# 30x         TKE at chapin  15# 30x           Walking lunges   5x 3x         Ther Activity                                       Gait Training                                       Modalities                                            "

## 2024-06-25 ENCOUNTER — OFFICE VISIT (OUTPATIENT)
Dept: PHYSICAL THERAPY | Facility: MEDICAL CENTER | Age: 54
End: 2024-06-25
Payer: OTHER MISCELLANEOUS

## 2024-06-25 DIAGNOSIS — Z98.890 S/P ARTHROSCOPIC PARTIAL MEDIAL MENISCECTOMY OF LEFT KNEE: Primary | ICD-10-CM

## 2024-06-25 DIAGNOSIS — Z87.828 S/P ARTHROSCOPIC PARTIAL MEDIAL MENISCECTOMY OF LEFT KNEE: Primary | ICD-10-CM

## 2024-06-25 PROCEDURE — 97110 THERAPEUTIC EXERCISES: CPT | Performed by: PHYSICAL THERAPIST

## 2024-06-25 NOTE — PROGRESS NOTES
"Daily Note     Today's date: 2024  Patient name: Huan Johns  : 1970  MRN: [id number]  Referring provider: Renea Camacho,*  Dx:   Encounter Diagnosis     ICD-10-CM    1. S/P arthroscopic partial medial meniscectomy of left knee  Z98.890     Z87.828                            Subjective: Bryson reports that he is doing okay but still has some complaints with kneeling, sharp pain once in a while     Objective: See treatment diary below      Assessment: Tolerated treatment well. Patient exhibited good technique with therapeutic exercises and fatigued appropriately with exercise.   He has regained full strength in his knee as well as mobility.   His remaining complaints center around activities such as kneeling which may take more time to fully resolve.  He will follow up with MD next week, impairments largely resolved from a PT perspective.  Return only if needed       Plan: Continue per plan of care.      Precautions: None      Manuals                                                              Neuro Re-Ed                                                                                                        Ther Ex             Bike 10 min 10 min 10 min 10 min         Standing hip abd/ext    Green 30x         PB squats at wall 30x 15# 30x 10#  30x 10#         SL squat to box 30x 30x  30x         Sit to stands   10# 30x          Step ups 8\" 15# 30x 8\" 15# 30x 8\" 10# 30x 8\" 20#30x         Lateral steps downs 8\" 30x 8\" 30x 15# 8\" 30x 8\" 30x 20#         Reverse lunges onto foam 30x 30x           Leg press SL 50# 30x DL 70# 30x SL 50# 30x SL 60# 30x         TKE at chapin  15# 30x           Walking lunges   5x 3x         Ther Activity                                       Gait Training                                       Modalities                                            "

## 2024-07-03 VITALS
WEIGHT: 195 LBS | BODY MASS INDEX: 30.61 KG/M2 | DIASTOLIC BLOOD PRESSURE: 84 MMHG | HEIGHT: 67 IN | HEART RATE: 62 BPM | SYSTOLIC BLOOD PRESSURE: 133 MMHG

## 2024-07-03 DIAGNOSIS — Z98.890 S/P ARTHROSCOPIC PARTIAL MEDIAL MENISCECTOMY OF LEFT KNEE: Primary | ICD-10-CM

## 2024-07-03 DIAGNOSIS — Z87.828 S/P ARTHROSCOPIC PARTIAL MEDIAL MENISCECTOMY OF LEFT KNEE: Primary | ICD-10-CM

## 2024-07-03 PROCEDURE — 99213 OFFICE O/P EST LOW 20 MIN: CPT | Performed by: ORTHOPAEDIC SURGERY

## 2024-07-03 NOTE — LETTER
July 3, 2024     Patient: Huan Johns  YOB: 1970  Date of Visit: 7/3/2024      To Whom it May Concern:    Huan Johns is under my professional care. Huan was seen in my office on 7/3/2024. Huan may return to work on 7-3-24 with no restrictions .    If you have any questions or concerns, please don't hesitate to call.         Sincerely,          Ron Mendoza DO        CC: No Recipients

## 2024-07-03 NOTE — PROGRESS NOTES
"Knee Post Operative Visit     Assesment:     54 y.o. male 3 months s/p surgical arthroscopy of the left knee with partial medial meniscectomy, DOS: 3/28/24      Plan:    Ice to knee for 20 minutes at least 1-2 times daily.  PT for ROM/strengthening to knee, hip and core.  OTC NSAIDS prn for pain.  Let pain guide gradual return activities.  RTW      Chief Complaint   Patient presents with    Left Knee - Follow-up       History of Present Illness:    The patient is a 54 y.o. male who is being evaluated post operatively 2 months  status post surgical arthroscopy of the left knee with partial medial meniscectomy, DOS: 3/28/24.    Since the prior visit, He reports minimal improvement. He is concerned with the swelling that is present.    Pain is well controlled.  The patient is using ice to control swelling.    He notes some occasional pain.  No new injury.  He is able to kneel on the floor and overall notes improvement.           I have reviewed the past medical, surgical, social and family history, medications and allergies as documented in the EMR.    Review of systems: ROS is negative other than that noted in the HPI.  Constitutional: Negative for fatigue and fever.        Physical Exam:    Blood pressure 133/84, pulse 62, height 5' 7\" (1.702 m), weight 88.5 kg (195 lb).    General/Constitutional: NAD, well developed, well nourished  HENT: Normocephalic, atraumatic  CV: Intact distal pulses, regular rate  Resp: No respiratory distress or labored breathing  Lymphatic: No lymphadenopathy palpated  Neuro: Alert and Oriented x 3, no focal deficits  Psych: Normal mood, normal affect, normal judgement, normal behavior  Skin: Warm, dry, no rashes, no erythema      Knee Exam (focused):                  RIGHT LEFT   ROM:   0-130 0-120   Palpation: Effusion negative trace     MJL tenderness Negative Negative     LJL tenderness Negative Negative   Instability: Varus stable stable     Valgus stable stable   Special Tests: " Lachman Negative Negative     Posterior drawer Negative Negative     Anterior drawer Negative Negative     Pivot shift not tested not tested     Dial not tested not tested   Patella: Palpation no tenderness no tenderness     Mobility 1/4 1/4     Apprehension Negative Negative   Other: Single leg 1/4 squat not tested not tested      Incisions show no erythema, no drainage    LE NV Exam: +2 DP/PT pulses bilaterally  Sensation intact to light touch L2-S1 bilaterally     Bilateral hip ROM demonstrates no pain actively or passively    No calf tenderness to palpation bilaterally    Scribe Attestation      I,:   am acting as a scribe while in the presence of the attending physician.:       I,:   personally performed the services described in this documentation    as scribed in my presence.:

## 2024-07-08 ENCOUNTER — TELEPHONE (OUTPATIENT)
Age: 54
End: 2024-07-08

## 2024-07-08 NOTE — TELEPHONE ENCOUNTER
Caller: [facility] [facility]    Doctor: Reji    Reason for call: called for 7/3 office notes to be faxed     Fax # [phone]       Office notes were electronically faxed     Call back#: n/a

## 2025-05-02 ENCOUNTER — OFFICE VISIT (OUTPATIENT)
Dept: OBGYN CLINIC | Facility: MEDICAL CENTER | Age: 55
End: 2025-05-02

## 2025-05-02 VITALS — WEIGHT: 193 LBS | BODY MASS INDEX: 30.29 KG/M2 | HEIGHT: 67 IN

## 2025-05-02 DIAGNOSIS — S83.242A OTHER TEAR OF MEDIAL MENISCUS, CURRENT INJURY, LEFT KNEE, INITIAL ENCOUNTER: Primary | ICD-10-CM

## 2025-05-02 PROCEDURE — 99213 OFFICE O/P EST LOW 20 MIN: CPT | Performed by: ORTHOPAEDIC SURGERY

## 2025-05-02 NOTE — PROGRESS NOTES
Knee Post Operative Visit     Assesment:     54 y.o. male 13 months s/p surgical arthroscopy of the left knee with partial medial meniscectomy and medial djd, DOS: 3/28/24      Plan:    Ice to knee for 20 minutes at least 1-2 times daily.    PT for ROM/strengthening to knee, hip and core.  He wants work on his own exercises as he remembers the prior PT he did.      Can consider corticosteroid injection if you choose in the future    May consider MRI of the knee to rule out meniscus retear if his pain persist in the future but this is doubtful and his degenerative changes seen at the time of arthroscopy are  more likely to be the cause of his pain and his pain is anterior now    No chief complaint on file.      History of Present Illness:    The patient is a 54 y.o. male who is being evaluated post operatively 13 months  status post surgical arthroscopy of the left knee with partial medial meniscectomy, DOS: 3/28/24.    Since the prior visit, He has had recurrence of medial joint pain.  He notes no specific new injury.  No instability.  No numbness or tingling.  Better with rest and worse with activities.  He has had no injections.  He has tried medications in exercises.     He was fired after he returned from his surgery.    Pain is anterior.  He notes swelling and clicking.      I have reviewed the past medical, surgical, social and family history, medications and allergies as documented in the EMR.    Review of systems: ROS is negative other than that noted in the HPI.  Constitutional: Negative for fatigue and fever.        Physical Exam:    There were no vitals taken for this visit.    General/Constitutional: NAD, well developed, well nourished  HENT: Normocephalic, atraumatic  CV: Intact distal pulses, regular rate  Resp: No respiratory distress or labored breathing  Lymphatic: No lymphadenopathy palpated  Neuro: Alert and Oriented x 3, no focal deficits  Psych: Normal mood, normal affect, normal judgement,  normal behavior  Skin: Warm, dry, no rashes, no erythema      Knee Exam (focused):                  RIGHT LEFT   ROM:   0-130 0-120   Palpation: Effusion negative trace     MJL tenderness Negative Negative     LJL tenderness Negative Negative   Instability: Varus stable stable     Valgus stable stable   Special Tests: Lachman Negative Negative     Posterior drawer Negative Negative     Anterior drawer Negative Negative     Pivot shift not tested not tested     Dial not tested not tested   Patella: Palpation no tenderness no tenderness     Mobility 1/4 1/4     Apprehension Negative Negative   Other: Single leg 1/4 squat not tested not tested      Incisions show no erythema, no drainage    LE NV Exam: +2 DP/PT pulses bilaterally  Sensation intact to light touch L2-S1 bilaterally     Bilateral hip ROM demonstrates no pain actively or passively    No calf tenderness to palpation bilaterally    Scribe Attestation      I,:   am acting as a scribe while in the presence of the attending physician.:       I,:   personally performed the services described in this documentation    as scribed in my presence.:

## (undated) DEVICE — 3M™ STERI-STRIP™ REINFORCED ADHESIVE SKIN CLOSURES, R1547, 1/2 IN X 4 IN (12 MM X 100 MM), 6 STRIPS/ENVELOPE: Brand: 3M™ STERI-STRIP™

## (undated) DEVICE — GLOVE INDICATOR PI UNDERGLOVE SZ 7 BLUE

## (undated) DEVICE — GLOVE INDICATOR PI UNDERGLOVE SZ 8.5 BLUE

## (undated) DEVICE — DRAPE SHEET THREE QUARTER

## (undated) DEVICE — IMPERVIOUS STOCKINETTE: Brand: DEROYAL

## (undated) DEVICE — PADDING CAST 6IN COTTON STRL

## (undated) DEVICE — 3M™ IOBAN™ 2 ANTIMICROBIAL INCISE DRAPE 6650EZ: Brand: IOBAN™ 2

## (undated) DEVICE — GAUZE SPONGES,16 PLY: Brand: CURITY

## (undated) DEVICE — EXTREMITY DRAPE W/ARMBOARD COVERS: Brand: CONVERTORS

## (undated) DEVICE — BLADE SHAVER DISSECTOR  4MM 13CM CRV COOLCUT

## (undated) DEVICE — GLOVE SRG BIOGEL 8.5

## (undated) DEVICE — ARTHROSCOPY FLOOR MAT

## (undated) DEVICE — ACE WRAP 6 IN UNSTERILE

## (undated) DEVICE — COBAN 6 IN STERILE

## (undated) DEVICE — 3M™ STERI-DRAPE™ U-DRAPE 1015: Brand: STERI-DRAPE™

## (undated) DEVICE — CUFF TOURNIQUET 30 X 4 IN QUICK CONNECT DISP 1BLA

## (undated) DEVICE — ABDOMINAL PAD: Brand: DERMACEA

## (undated) DEVICE — OCCLUSIVE GAUZE STRIP,3% BISMUTH TRIBROMOPHENATE IN PETROLATUM BLEND: Brand: XEROFORM

## (undated) DEVICE — INTENDED FOR TISSUE SEPARATION, AND OTHER PROCEDURES THAT REQUIRE A SHARP SURGICAL BLADE TO PUNCTURE OR CUT.: Brand: BARD-PARKER ® CARBON RIB-BACK BLADES

## (undated) DEVICE — DISPOSABLE OR TOWEL: Brand: CARDINAL HEALTH

## (undated) DEVICE — SUT MONOCRYL 4-0 PS-2 27 IN Y426H

## (undated) DEVICE — SURGICAL GOWN, XL SMARTSLEEVE: Brand: CONVERTORS

## (undated) DEVICE — BETHLEHEM UNIVERSAL  ARTHRO PK: Brand: CARDINAL HEALTH

## (undated) DEVICE — TUBING ARTHROSCOPIC WAVE  MAIN PUMP

## (undated) DEVICE — GLOVE SRG BIOGEL 6.5

## (undated) DEVICE — SYRINGE 30ML LL